# Patient Record
Sex: FEMALE | Race: WHITE | NOT HISPANIC OR LATINO | Employment: OTHER | URBAN - METROPOLITAN AREA
[De-identification: names, ages, dates, MRNs, and addresses within clinical notes are randomized per-mention and may not be internally consistent; named-entity substitution may affect disease eponyms.]

---

## 2017-01-03 ENCOUNTER — RECORDS - HEALTHEAST (OUTPATIENT)
Dept: LAB | Facility: CLINIC | Age: 67
End: 2017-01-03

## 2017-01-03 LAB — HCV AB SERPL QL IA: NEGATIVE

## 2017-01-23 ENCOUNTER — INFUSION - HEALTHEAST (OUTPATIENT)
Dept: INFUSION THERAPY | Facility: CLINIC | Age: 67
End: 2017-01-23

## 2017-01-23 DIAGNOSIS — M06.09 SERONEGATIVE RHEUMATOID ARTHRITIS OF MULTIPLE SITES (H): ICD-10-CM

## 2017-02-15 ENCOUNTER — AMBULATORY - HEALTHEAST (OUTPATIENT)
Dept: LAB | Facility: CLINIC | Age: 67
End: 2017-02-15

## 2017-02-15 DIAGNOSIS — M06.9 RHEUMATOID ARTHRITIS (H): ICD-10-CM

## 2017-02-15 DIAGNOSIS — Z79.899 HIGH RISK MEDICATION USE: ICD-10-CM

## 2017-02-15 LAB
ALT SERPL W P-5'-P-CCNC: 17 U/L (ref 0–45)
CREAT SERPL-MCNC: 0.64 MG/DL (ref 0.6–1.1)
GFR SERPL CREATININE-BSD FRML MDRD: >60 ML/MIN/1.73M2

## 2017-02-22 ENCOUNTER — INFUSION - HEALTHEAST (OUTPATIENT)
Dept: INFUSION THERAPY | Facility: CLINIC | Age: 67
End: 2017-02-22

## 2017-02-22 DIAGNOSIS — M06.09 SERONEGATIVE RHEUMATOID ARTHRITIS OF MULTIPLE SITES (H): ICD-10-CM

## 2017-03-22 ENCOUNTER — COMMUNICATION - HEALTHEAST (OUTPATIENT)
Dept: RHEUMATOLOGY | Facility: CLINIC | Age: 67
End: 2017-03-22

## 2017-03-22 DIAGNOSIS — M06.9 RHEUMATOID ARTHRITIS (H): ICD-10-CM

## 2017-03-27 ENCOUNTER — INFUSION - HEALTHEAST (OUTPATIENT)
Dept: INFUSION THERAPY | Facility: CLINIC | Age: 67
End: 2017-03-27

## 2017-03-27 DIAGNOSIS — Z79.899 HIGH RISK MEDICATION USE: ICD-10-CM

## 2017-03-27 DIAGNOSIS — M06.09 SERONEGATIVE RHEUMATOID ARTHRITIS OF MULTIPLE SITES (H): ICD-10-CM

## 2017-03-27 DIAGNOSIS — M06.9 RHEUMATOID ARTHRITIS (H): ICD-10-CM

## 2017-03-27 LAB
ALT SERPL W P-5'-P-CCNC: 17 U/L (ref 0–45)
CREAT SERPL-MCNC: 0.63 MG/DL (ref 0.6–1.1)
GFR SERPL CREATININE-BSD FRML MDRD: >60 ML/MIN/1.73M2

## 2017-04-14 ENCOUNTER — AMBULATORY - HEALTHEAST (OUTPATIENT)
Dept: LAB | Facility: CLINIC | Age: 67
End: 2017-04-14

## 2017-04-14 DIAGNOSIS — Z79.899 HIGH RISK MEDICATION USE: ICD-10-CM

## 2017-04-14 DIAGNOSIS — M06.9 RHEUMATOID ARTHRITIS (H): ICD-10-CM

## 2017-04-14 LAB
ALT SERPL W P-5'-P-CCNC: 21 U/L (ref 0–45)
CREAT SERPL-MCNC: 0.68 MG/DL (ref 0.6–1.1)
GFR SERPL CREATININE-BSD FRML MDRD: >60 ML/MIN/1.73M2

## 2017-04-20 ENCOUNTER — OFFICE VISIT - HEALTHEAST (OUTPATIENT)
Dept: RHEUMATOLOGY | Facility: CLINIC | Age: 67
End: 2017-04-20

## 2017-04-20 DIAGNOSIS — G89.29 CHRONIC PAIN OF RIGHT KNEE: ICD-10-CM

## 2017-04-20 DIAGNOSIS — M06.09 SERONEGATIVE RHEUMATOID ARTHRITIS OF MULTIPLE SITES (H): ICD-10-CM

## 2017-04-20 DIAGNOSIS — M19.041 PRIMARY OSTEOARTHRITIS OF BOTH HANDS: ICD-10-CM

## 2017-04-20 DIAGNOSIS — M19.042 PRIMARY OSTEOARTHRITIS OF BOTH HANDS: ICD-10-CM

## 2017-04-20 DIAGNOSIS — M17.0 BILATERAL PRIMARY OSTEOARTHRITIS OF KNEE: ICD-10-CM

## 2017-04-20 DIAGNOSIS — M25.561 CHRONIC PAIN OF RIGHT KNEE: ICD-10-CM

## 2017-04-20 DIAGNOSIS — Z79.899 HIGH RISK MEDICATION USE: ICD-10-CM

## 2017-04-20 ASSESSMENT — MIFFLIN-ST. JEOR: SCORE: 1590.23

## 2017-04-24 ENCOUNTER — COMMUNICATION - HEALTHEAST (OUTPATIENT)
Dept: ENDOCRINOLOGY | Facility: CLINIC | Age: 67
End: 2017-04-24

## 2017-04-26 ENCOUNTER — INFUSION - HEALTHEAST (OUTPATIENT)
Dept: INFUSION THERAPY | Facility: CLINIC | Age: 67
End: 2017-04-26

## 2017-04-26 DIAGNOSIS — M06.09 SERONEGATIVE RHEUMATOID ARTHRITIS OF MULTIPLE SITES (H): ICD-10-CM

## 2017-04-27 ENCOUNTER — OFFICE VISIT - HEALTHEAST (OUTPATIENT)
Dept: RHEUMATOLOGY | Facility: CLINIC | Age: 67
End: 2017-04-27

## 2017-04-27 DIAGNOSIS — M17.0 BILATERAL PRIMARY OSTEOARTHRITIS OF KNEE: ICD-10-CM

## 2017-04-27 DIAGNOSIS — G89.29 CHRONIC PAIN OF RIGHT KNEE: ICD-10-CM

## 2017-04-27 DIAGNOSIS — M25.561 CHRONIC PAIN OF RIGHT KNEE: ICD-10-CM

## 2017-04-27 ASSESSMENT — MIFFLIN-ST. JEOR: SCORE: 1590.23

## 2017-05-04 ENCOUNTER — OFFICE VISIT - HEALTHEAST (OUTPATIENT)
Dept: RHEUMATOLOGY | Facility: CLINIC | Age: 67
End: 2017-05-04

## 2017-05-04 DIAGNOSIS — M17.0 BILATERAL PRIMARY OSTEOARTHRITIS OF KNEE: ICD-10-CM

## 2017-05-30 ENCOUNTER — INFUSION - HEALTHEAST (OUTPATIENT)
Dept: INFUSION THERAPY | Facility: CLINIC | Age: 67
End: 2017-05-30

## 2017-05-30 DIAGNOSIS — M06.09 SERONEGATIVE RHEUMATOID ARTHRITIS OF MULTIPLE SITES (H): ICD-10-CM

## 2017-06-13 ENCOUNTER — COMMUNICATION - HEALTHEAST (OUTPATIENT)
Dept: RHEUMATOLOGY | Facility: CLINIC | Age: 67
End: 2017-06-13

## 2017-06-13 DIAGNOSIS — M06.9 RHEUMATOID ARTHRITIS (H): ICD-10-CM

## 2017-06-28 ENCOUNTER — INFUSION - HEALTHEAST (OUTPATIENT)
Dept: INFUSION THERAPY | Facility: CLINIC | Age: 67
End: 2017-06-28

## 2017-06-28 DIAGNOSIS — M06.9 RHEUMATOID ARTHRITIS (H): ICD-10-CM

## 2017-06-28 DIAGNOSIS — M06.09 SERONEGATIVE RHEUMATOID ARTHRITIS OF MULTIPLE SITES (H): ICD-10-CM

## 2017-06-28 LAB
ALT SERPL W P-5'-P-CCNC: 20 U/L (ref 0–45)
CREAT SERPL-MCNC: 0.62 MG/DL (ref 0.6–1.1)
GFR SERPL CREATININE-BSD FRML MDRD: >60 ML/MIN/1.73M2

## 2017-07-06 ENCOUNTER — OFFICE VISIT - HEALTHEAST (OUTPATIENT)
Dept: RHEUMATOLOGY | Facility: CLINIC | Age: 67
End: 2017-07-06

## 2017-07-06 DIAGNOSIS — Z79.899 HIGH RISK MEDICATION USE: ICD-10-CM

## 2017-07-06 DIAGNOSIS — M25.561 CHRONIC PAIN OF RIGHT KNEE: ICD-10-CM

## 2017-07-06 DIAGNOSIS — M17.0 BILATERAL PRIMARY OSTEOARTHRITIS OF KNEE: ICD-10-CM

## 2017-07-06 DIAGNOSIS — G89.29 CHRONIC PAIN OF RIGHT KNEE: ICD-10-CM

## 2017-07-06 DIAGNOSIS — M06.09 SERONEGATIVE RHEUMATOID ARTHRITIS OF MULTIPLE SITES (H): ICD-10-CM

## 2017-07-06 RX ORDER — FOLIC ACID 1 MG/1
3 TABLET ORAL DAILY
Qty: 270 TABLET | Refills: 3 | Status: SHIPPED | OUTPATIENT
Start: 2017-07-06 | End: 2017-10-04

## 2017-07-25 ENCOUNTER — COMMUNICATION - HEALTHEAST (OUTPATIENT)
Dept: RHEUMATOLOGY | Facility: CLINIC | Age: 67
End: 2017-07-25

## 2017-07-25 DIAGNOSIS — M06.9 RHEUMATOID ARTHRITIS (H): ICD-10-CM

## 2017-07-31 ENCOUNTER — INFUSION - HEALTHEAST (OUTPATIENT)
Dept: INFUSION THERAPY | Facility: CLINIC | Age: 67
End: 2017-07-31

## 2017-07-31 DIAGNOSIS — M06.09 SERONEGATIVE RHEUMATOID ARTHRITIS OF MULTIPLE SITES (H): ICD-10-CM

## 2017-07-31 RX ORDER — DULOXETIN HYDROCHLORIDE 30 MG/1
CAPSULE, DELAYED RELEASE ORAL
Refills: 3 | Status: SHIPPED | COMMUNITY
Start: 2017-06-29

## 2017-08-02 ENCOUNTER — RECORDS - HEALTHEAST (OUTPATIENT)
Dept: ADMINISTRATIVE | Facility: OTHER | Age: 67
End: 2017-08-02

## 2017-08-30 ENCOUNTER — INFUSION - HEALTHEAST (OUTPATIENT)
Dept: INFUSION THERAPY | Facility: CLINIC | Age: 67
End: 2017-08-30

## 2017-08-30 DIAGNOSIS — M06.09 SERONEGATIVE RHEUMATOID ARTHRITIS OF MULTIPLE SITES (H): ICD-10-CM

## 2017-09-06 ENCOUNTER — AMBULATORY - HEALTHEAST (OUTPATIENT)
Dept: LAB | Facility: CLINIC | Age: 67
End: 2017-09-06

## 2017-09-06 DIAGNOSIS — M06.9 RHEUMATOID ARTHRITIS (H): ICD-10-CM

## 2017-09-06 LAB
ALT SERPL W P-5'-P-CCNC: 22 U/L (ref 0–45)
CREAT SERPL-MCNC: 0.63 MG/DL (ref 0.6–1.1)
GFR SERPL CREATININE-BSD FRML MDRD: >60 ML/MIN/1.73M2

## 2017-09-21 ENCOUNTER — AMBULATORY - HEALTHEAST (OUTPATIENT)
Dept: PHYSICAL MEDICINE AND REHAB | Facility: CLINIC | Age: 67
End: 2017-09-21

## 2017-09-21 DIAGNOSIS — M54.50 LOWER BACK PAIN: ICD-10-CM

## 2017-09-22 ENCOUNTER — HOSPITAL ENCOUNTER (OUTPATIENT)
Dept: PHYSICAL MEDICINE AND REHAB | Facility: CLINIC | Age: 67
Discharge: HOME | End: 2017-09-22
Attending: PHYSICIAN ASSISTANT

## 2017-09-22 DIAGNOSIS — Z98.1 HISTORY OF LUMBAR FUSION: ICD-10-CM

## 2017-09-22 DIAGNOSIS — G89.29 CHRONIC LOW BACK PAIN: ICD-10-CM

## 2017-09-22 DIAGNOSIS — R53.81 PHYSICAL DECONDITIONING: ICD-10-CM

## 2017-09-22 DIAGNOSIS — M54.50 CHRONIC LOW BACK PAIN: ICD-10-CM

## 2017-09-22 DIAGNOSIS — R26.89 BALANCE PROBLEMS: ICD-10-CM

## 2017-10-06 ENCOUNTER — OFFICE VISIT - HEALTHEAST (OUTPATIENT)
Dept: PHYSICAL THERAPY | Facility: REHABILITATION | Age: 67
End: 2017-10-06

## 2017-10-06 DIAGNOSIS — R29.3 ABNORMAL POSTURE: ICD-10-CM

## 2017-10-06 DIAGNOSIS — M54.50 LUMBAR SPINE PAIN: ICD-10-CM

## 2017-10-06 DIAGNOSIS — M62.81 GENERALIZED MUSCLE WEAKNESS: ICD-10-CM

## 2017-10-06 DIAGNOSIS — Z74.09 IMPAIRED FUNCTIONAL MOBILITY, BALANCE, GAIT, AND ENDURANCE: ICD-10-CM

## 2017-10-10 ENCOUNTER — OFFICE VISIT - HEALTHEAST (OUTPATIENT)
Dept: PHYSICAL THERAPY | Facility: REHABILITATION | Age: 67
End: 2017-10-10

## 2017-10-10 ENCOUNTER — INFUSION - HEALTHEAST (OUTPATIENT)
Dept: INFUSION THERAPY | Facility: CLINIC | Age: 67
End: 2017-10-10

## 2017-10-10 ENCOUNTER — COMMUNICATION - HEALTHEAST (OUTPATIENT)
Dept: PHYSICAL MEDICINE AND REHAB | Facility: CLINIC | Age: 67
End: 2017-10-10

## 2017-10-10 DIAGNOSIS — R29.3 ABNORMAL POSTURE: ICD-10-CM

## 2017-10-10 DIAGNOSIS — M54.50 LUMBAR SPINE PAIN: ICD-10-CM

## 2017-10-10 DIAGNOSIS — M06.09 SERONEGATIVE RHEUMATOID ARTHRITIS OF MULTIPLE SITES (H): ICD-10-CM

## 2017-10-10 DIAGNOSIS — Z74.09 IMPAIRED FUNCTIONAL MOBILITY, BALANCE, GAIT, AND ENDURANCE: ICD-10-CM

## 2017-10-10 DIAGNOSIS — M62.81 GENERALIZED MUSCLE WEAKNESS: ICD-10-CM

## 2017-10-11 ENCOUNTER — RECORDS - HEALTHEAST (OUTPATIENT)
Dept: ADMINISTRATIVE | Facility: OTHER | Age: 67
End: 2017-10-11

## 2017-10-18 ENCOUNTER — OFFICE VISIT - HEALTHEAST (OUTPATIENT)
Dept: PHYSICAL THERAPY | Facility: REHABILITATION | Age: 67
End: 2017-10-18

## 2017-10-18 DIAGNOSIS — Z74.09 IMPAIRED FUNCTIONAL MOBILITY, BALANCE, GAIT, AND ENDURANCE: ICD-10-CM

## 2017-10-18 DIAGNOSIS — M54.50 LUMBAR SPINE PAIN: ICD-10-CM

## 2017-10-18 DIAGNOSIS — R29.3 ABNORMAL POSTURE: ICD-10-CM

## 2017-10-18 DIAGNOSIS — M62.81 GENERALIZED MUSCLE WEAKNESS: ICD-10-CM

## 2017-10-25 ENCOUNTER — OFFICE VISIT - HEALTHEAST (OUTPATIENT)
Dept: PHYSICAL THERAPY | Facility: REHABILITATION | Age: 67
End: 2017-10-25

## 2017-10-25 DIAGNOSIS — R29.3 ABNORMAL POSTURE: ICD-10-CM

## 2017-10-25 DIAGNOSIS — M62.81 GENERALIZED MUSCLE WEAKNESS: ICD-10-CM

## 2017-10-25 DIAGNOSIS — M54.50 LUMBAR SPINE PAIN: ICD-10-CM

## 2017-10-25 DIAGNOSIS — Z74.09 IMPAIRED FUNCTIONAL MOBILITY, BALANCE, GAIT, AND ENDURANCE: ICD-10-CM

## 2017-11-01 ENCOUNTER — OFFICE VISIT - HEALTHEAST (OUTPATIENT)
Dept: PHYSICAL THERAPY | Facility: REHABILITATION | Age: 67
End: 2017-11-01

## 2017-11-01 DIAGNOSIS — R29.3 ABNORMAL POSTURE: ICD-10-CM

## 2017-11-01 DIAGNOSIS — M62.81 GENERALIZED MUSCLE WEAKNESS: ICD-10-CM

## 2017-11-01 DIAGNOSIS — M54.50 LUMBAR SPINE PAIN: ICD-10-CM

## 2017-11-01 DIAGNOSIS — Z74.09 IMPAIRED FUNCTIONAL MOBILITY, BALANCE, GAIT, AND ENDURANCE: ICD-10-CM

## 2017-11-06 ENCOUNTER — AMBULATORY - HEALTHEAST (OUTPATIENT)
Dept: RHEUMATOLOGY | Facility: CLINIC | Age: 67
End: 2017-11-06

## 2017-11-06 ENCOUNTER — AMBULATORY - HEALTHEAST (OUTPATIENT)
Dept: LAB | Facility: CLINIC | Age: 67
End: 2017-11-06

## 2017-11-06 DIAGNOSIS — M06.9 RHEUMATOID ARTHRITIS (H): ICD-10-CM

## 2017-11-06 LAB
ALT SERPL W P-5'-P-CCNC: 23 U/L (ref 0–45)
CREAT SERPL-MCNC: 0.6 MG/DL (ref 0.6–1.1)
GFR SERPL CREATININE-BSD FRML MDRD: >60 ML/MIN/1.73M2

## 2017-11-08 ENCOUNTER — OFFICE VISIT - HEALTHEAST (OUTPATIENT)
Dept: RHEUMATOLOGY | Facility: CLINIC | Age: 67
End: 2017-11-08

## 2017-11-08 DIAGNOSIS — M06.00 SERONEGATIVE RHEUMATOID ARTHRITIS (H): ICD-10-CM

## 2017-11-08 DIAGNOSIS — Z98.84 HISTORY OF GASTRIC BYPASS: ICD-10-CM

## 2017-11-08 DIAGNOSIS — M17.11 OSTEOARTHRITIS OF RIGHT KNEE, UNSPECIFIED OSTEOARTHRITIS TYPE: ICD-10-CM

## 2017-11-08 DIAGNOSIS — Z79.899 HIGH RISK MEDICATION USE: ICD-10-CM

## 2017-11-08 DIAGNOSIS — M06.09 SERONEGATIVE RHEUMATOID ARTHRITIS OF MULTIPLE SITES (H): ICD-10-CM

## 2017-11-08 DIAGNOSIS — M79.7 FIBROMYALGIA: ICD-10-CM

## 2017-11-08 DIAGNOSIS — M81.0 OSTEOPOROSIS: ICD-10-CM

## 2017-11-08 ASSESSMENT — MIFFLIN-ST. JEOR: SCORE: 1535.8

## 2017-11-09 ENCOUNTER — INFUSION - HEALTHEAST (OUTPATIENT)
Dept: INFUSION THERAPY | Facility: CLINIC | Age: 67
End: 2017-11-09

## 2017-11-09 DIAGNOSIS — M06.09 SERONEGATIVE RHEUMATOID ARTHRITIS OF MULTIPLE SITES (H): ICD-10-CM

## 2017-11-16 ENCOUNTER — COMMUNICATION - HEALTHEAST (OUTPATIENT)
Dept: RHEUMATOLOGY | Facility: CLINIC | Age: 67
End: 2017-11-16

## 2017-11-16 DIAGNOSIS — Z79.899 HIGH RISK MEDICATION USE: ICD-10-CM

## 2017-12-11 ENCOUNTER — INFUSION - HEALTHEAST (OUTPATIENT)
Dept: INFUSION THERAPY | Facility: CLINIC | Age: 67
End: 2017-12-11

## 2017-12-11 DIAGNOSIS — M06.09 SERONEGATIVE RHEUMATOID ARTHRITIS OF MULTIPLE SITES (H): ICD-10-CM

## 2018-01-10 ENCOUNTER — INFUSION - HEALTHEAST (OUTPATIENT)
Dept: INFUSION THERAPY | Facility: CLINIC | Age: 68
End: 2018-01-10

## 2018-01-10 DIAGNOSIS — M06.09 SERONEGATIVE RHEUMATOID ARTHRITIS OF MULTIPLE SITES (H): ICD-10-CM

## 2018-02-12 ENCOUNTER — INFUSION - HEALTHEAST (OUTPATIENT)
Dept: INFUSION THERAPY | Facility: CLINIC | Age: 68
End: 2018-02-12

## 2018-02-12 DIAGNOSIS — M06.09 SERONEGATIVE RHEUMATOID ARTHRITIS OF MULTIPLE SITES (H): ICD-10-CM

## 2018-02-12 DIAGNOSIS — Z79.899 HIGH RISK MEDICATION USE: ICD-10-CM

## 2018-02-12 DIAGNOSIS — M06.00 SERONEGATIVE RHEUMATOID ARTHRITIS (H): ICD-10-CM

## 2018-02-12 LAB
ALBUMIN SERPL-MCNC: 3.4 G/DL (ref 3.5–5)
ALT SERPL W P-5'-P-CCNC: 18 U/L (ref 0–45)
AST SERPL W P-5'-P-CCNC: 16 U/L (ref 0–40)
C REACTIVE PROTEIN LHE: 0.7 MG/DL (ref 0–0.8)
CREAT SERPL-MCNC: 0.64 MG/DL (ref 0.6–1.1)
ERYTHROCYTE [DISTWIDTH] IN BLOOD BY AUTOMATED COUNT: 14.4 % (ref 11–14.5)
ERYTHROCYTE [SEDIMENTATION RATE] IN BLOOD BY WESTERGREN METHOD: 25 MM/HR (ref 0–20)
GFR SERPL CREATININE-BSD FRML MDRD: >60 ML/MIN/1.73M2
HCT VFR BLD AUTO: 39.6 % (ref 35–47)
HGB BLD-MCNC: 13.3 G/DL (ref 12–16)
MCH RBC QN AUTO: 31.3 PG (ref 27–34)
MCHC RBC AUTO-ENTMCNC: 33.6 G/DL (ref 32–36)
MCV RBC AUTO: 93 FL (ref 80–100)
PLATELET # BLD AUTO: 288 THOU/UL (ref 140–440)
PMV BLD AUTO: 12 FL (ref 8.5–12.5)
RBC # BLD AUTO: 4.25 MILL/UL (ref 3.8–5.4)
WBC: 8.9 THOU/UL (ref 4–11)

## 2018-03-14 ENCOUNTER — INFUSION - HEALTHEAST (OUTPATIENT)
Dept: INFUSION THERAPY | Facility: CLINIC | Age: 68
End: 2018-03-14

## 2018-03-14 DIAGNOSIS — M06.09 SERONEGATIVE RHEUMATOID ARTHRITIS OF MULTIPLE SITES (H): ICD-10-CM

## 2018-04-19 ENCOUNTER — INFUSION - HEALTHEAST (OUTPATIENT)
Dept: INFUSION THERAPY | Facility: CLINIC | Age: 68
End: 2018-04-19

## 2018-04-19 DIAGNOSIS — M06.09 SERONEGATIVE RHEUMATOID ARTHRITIS OF MULTIPLE SITES (H): ICD-10-CM

## 2018-04-19 DIAGNOSIS — Z79.899 HIGH RISK MEDICATION USE: ICD-10-CM

## 2018-04-19 DIAGNOSIS — M06.00 SERONEGATIVE RHEUMATOID ARTHRITIS (H): ICD-10-CM

## 2018-04-19 LAB
ALBUMIN SERPL-MCNC: 3.1 G/DL (ref 3.5–5)
ALT SERPL W P-5'-P-CCNC: 23 U/L (ref 0–45)
AST SERPL W P-5'-P-CCNC: 20 U/L (ref 0–40)
CREAT SERPL-MCNC: 0.61 MG/DL (ref 0.6–1.1)
ERYTHROCYTE [DISTWIDTH] IN BLOOD BY AUTOMATED COUNT: 14.4 % (ref 11–14.5)
GFR SERPL CREATININE-BSD FRML MDRD: >60 ML/MIN/1.73M2
HCT VFR BLD AUTO: 38.1 % (ref 35–47)
HGB BLD-MCNC: 12.7 G/DL (ref 12–16)
MCH RBC QN AUTO: 31.1 PG (ref 27–34)
MCHC RBC AUTO-ENTMCNC: 33.3 G/DL (ref 32–36)
MCV RBC AUTO: 93 FL (ref 80–100)
PLATELET # BLD AUTO: 293 THOU/UL (ref 140–440)
PMV BLD AUTO: 11.5 FL (ref 8.5–12.5)
RBC # BLD AUTO: 4.08 MILL/UL (ref 3.8–5.4)
WBC: 8.7 THOU/UL (ref 4–11)

## 2018-04-30 ENCOUNTER — OFFICE VISIT - HEALTHEAST (OUTPATIENT)
Dept: RHEUMATOLOGY | Facility: CLINIC | Age: 68
End: 2018-04-30

## 2018-04-30 DIAGNOSIS — Z79.899 HIGH RISK MEDICATION USE: ICD-10-CM

## 2018-04-30 DIAGNOSIS — M19.042 PRIMARY OSTEOARTHRITIS OF BOTH HANDS: ICD-10-CM

## 2018-04-30 DIAGNOSIS — M17.11 OSTEOARTHRITIS OF RIGHT KNEE, UNSPECIFIED OSTEOARTHRITIS TYPE: ICD-10-CM

## 2018-04-30 DIAGNOSIS — M19.041 PRIMARY OSTEOARTHRITIS OF BOTH HANDS: ICD-10-CM

## 2018-04-30 DIAGNOSIS — M06.00 SERONEGATIVE RHEUMATOID ARTHRITIS (H): ICD-10-CM

## 2018-04-30 DIAGNOSIS — M79.7 FIBROMYALGIA: ICD-10-CM

## 2018-04-30 ASSESSMENT — MIFFLIN-ST. JEOR: SCORE: 1517.66

## 2018-05-21 ENCOUNTER — INFUSION - HEALTHEAST (OUTPATIENT)
Dept: INFUSION THERAPY | Facility: CLINIC | Age: 68
End: 2018-05-21

## 2018-05-21 DIAGNOSIS — M06.09 SERONEGATIVE RHEUMATOID ARTHRITIS OF MULTIPLE SITES (H): ICD-10-CM

## 2018-06-19 ENCOUNTER — RECORDS - HEALTHEAST (OUTPATIENT)
Dept: LAB | Facility: CLINIC | Age: 68
End: 2018-06-19

## 2018-06-19 LAB
ANION GAP SERPL CALCULATED.3IONS-SCNC: 14 MMOL/L (ref 5–18)
BUN SERPL-MCNC: 22 MG/DL (ref 8–22)
CALCIUM SERPL-MCNC: 9.3 MG/DL (ref 8.5–10.5)
CHLORIDE BLD-SCNC: 105 MMOL/L (ref 98–107)
CO2 SERPL-SCNC: 23 MMOL/L (ref 22–31)
CREAT SERPL-MCNC: 0.67 MG/DL (ref 0.6–1.1)
GFR SERPL CREATININE-BSD FRML MDRD: >60 ML/MIN/1.73M2
GLUCOSE BLD-MCNC: 106 MG/DL (ref 70–125)
POTASSIUM BLD-SCNC: 3.6 MMOL/L (ref 3.5–5)
SODIUM SERPL-SCNC: 142 MMOL/L (ref 136–145)

## 2018-06-20 ENCOUNTER — INFUSION - HEALTHEAST (OUTPATIENT)
Dept: INFUSION THERAPY | Facility: CLINIC | Age: 68
End: 2018-06-20

## 2018-06-20 DIAGNOSIS — M06.09 SERONEGATIVE RHEUMATOID ARTHRITIS OF MULTIPLE SITES (H): ICD-10-CM

## 2018-07-23 ENCOUNTER — INFUSION - HEALTHEAST (OUTPATIENT)
Dept: INFUSION THERAPY | Facility: CLINIC | Age: 68
End: 2018-07-23

## 2018-07-23 DIAGNOSIS — M06.09 SERONEGATIVE RHEUMATOID ARTHRITIS OF MULTIPLE SITES (H): ICD-10-CM

## 2018-07-23 DIAGNOSIS — Z79.899 HIGH RISK MEDICATION USE: ICD-10-CM

## 2018-07-23 DIAGNOSIS — M06.00 SERONEGATIVE RHEUMATOID ARTHRITIS (H): ICD-10-CM

## 2018-07-23 LAB
ALBUMIN SERPL-MCNC: 3.1 G/DL (ref 3.5–5)
ALT SERPL W P-5'-P-CCNC: 20 U/L (ref 0–45)
CREAT SERPL-MCNC: 0.55 MG/DL (ref 0.6–1.1)
ERYTHROCYTE [DISTWIDTH] IN BLOOD BY AUTOMATED COUNT: 15.2 % (ref 11–14.5)
GFR SERPL CREATININE-BSD FRML MDRD: >60 ML/MIN/1.73M2
HCT VFR BLD AUTO: 36.2 % (ref 35–47)
HGB BLD-MCNC: 12.1 G/DL (ref 12–16)
MCH RBC QN AUTO: 31.8 PG (ref 27–34)
MCHC RBC AUTO-ENTMCNC: 33.4 G/DL (ref 32–36)
MCV RBC AUTO: 95 FL (ref 80–100)
PLATELET # BLD AUTO: 253 THOU/UL (ref 140–440)
PMV BLD AUTO: 11.6 FL (ref 8.5–12.5)
RBC # BLD AUTO: 3.8 MILL/UL (ref 3.8–5.4)
WBC: 7.7 THOU/UL (ref 4–11)

## 2018-08-22 ENCOUNTER — INFUSION - HEALTHEAST (OUTPATIENT)
Dept: INFUSION THERAPY | Facility: CLINIC | Age: 68
End: 2018-08-22

## 2018-08-22 DIAGNOSIS — M06.09 SERONEGATIVE RHEUMATOID ARTHRITIS OF MULTIPLE SITES (H): ICD-10-CM

## 2018-08-22 LAB — AST SERPL W P-5'-P-CCNC: 15 U/L (ref 0–40)

## 2018-08-29 ENCOUNTER — OFFICE VISIT - HEALTHEAST (OUTPATIENT)
Dept: RHEUMATOLOGY | Facility: CLINIC | Age: 68
End: 2018-08-29

## 2018-08-29 DIAGNOSIS — M15.9 OSTEOARTHRITIS OF MULTIPLE JOINTS, UNSPECIFIED OSTEOARTHRITIS TYPE: ICD-10-CM

## 2018-08-29 DIAGNOSIS — M06.00 SERONEGATIVE RHEUMATOID ARTHRITIS (H): ICD-10-CM

## 2018-08-29 DIAGNOSIS — G89.29 CHRONIC BILATERAL LOW BACK PAIN, WITH SCIATICA PRESENCE UNSPECIFIED: ICD-10-CM

## 2018-08-29 DIAGNOSIS — Z79.899 HIGH RISK MEDICATION USE: ICD-10-CM

## 2018-08-29 DIAGNOSIS — G89.29 CHRONIC PAIN OF RIGHT KNEE: ICD-10-CM

## 2018-08-29 DIAGNOSIS — M54.5 CHRONIC BILATERAL LOW BACK PAIN, WITH SCIATICA PRESENCE UNSPECIFIED: ICD-10-CM

## 2018-08-29 DIAGNOSIS — M25.561 CHRONIC PAIN OF RIGHT KNEE: ICD-10-CM

## 2018-08-29 RX ORDER — LIDOCAINE 50 MG/G
PATCH TOPICAL
Qty: 30 PATCH | Refills: 2 | Status: SHIPPED | OUTPATIENT
Start: 2018-08-29

## 2018-08-29 ASSESSMENT — MIFFLIN-ST. JEOR: SCORE: 1517.66

## 2018-08-30 ENCOUNTER — COMMUNICATION - HEALTHEAST (OUTPATIENT)
Dept: RHEUMATOLOGY | Facility: CLINIC | Age: 68
End: 2018-08-30

## 2018-08-30 DIAGNOSIS — M06.00 SERONEGATIVE RHEUMATOID ARTHRITIS (H): ICD-10-CM

## 2018-09-04 ENCOUNTER — COMMUNICATION - HEALTHEAST (OUTPATIENT)
Dept: RHEUMATOLOGY | Facility: CLINIC | Age: 68
End: 2018-09-04

## 2018-09-21 ENCOUNTER — INFUSION - HEALTHEAST (OUTPATIENT)
Dept: INFUSION THERAPY | Facility: CLINIC | Age: 68
End: 2018-09-21

## 2018-09-21 DIAGNOSIS — Z79.899 HIGH RISK MEDICATION USE: ICD-10-CM

## 2018-09-21 DIAGNOSIS — M06.09 SERONEGATIVE RHEUMATOID ARTHRITIS OF MULTIPLE SITES (H): ICD-10-CM

## 2018-09-21 LAB
ALBUMIN SERPL-MCNC: 3.2 G/DL (ref 3.5–5)
ALT SERPL W P-5'-P-CCNC: 18 U/L (ref 0–45)
AST SERPL W P-5'-P-CCNC: 12 U/L (ref 0–40)
CREAT SERPL-MCNC: 0.62 MG/DL (ref 0.6–1.1)
ERYTHROCYTE [DISTWIDTH] IN BLOOD BY AUTOMATED COUNT: 13.8 % (ref 11–14.5)
GFR SERPL CREATININE-BSD FRML MDRD: >60 ML/MIN/1.73M2
HCT VFR BLD AUTO: 38.9 % (ref 35–47)
HGB BLD-MCNC: 12.7 G/DL (ref 12–16)
MCH RBC QN AUTO: 31.4 PG (ref 27–34)
MCHC RBC AUTO-ENTMCNC: 32.6 G/DL (ref 32–36)
MCV RBC AUTO: 96 FL (ref 80–100)
PLATELET # BLD AUTO: 293 THOU/UL (ref 140–440)
PMV BLD AUTO: 11.3 FL (ref 8.5–12.5)
RBC # BLD AUTO: 4.05 MILL/UL (ref 3.8–5.4)
WBC: 7.6 THOU/UL (ref 4–11)

## 2018-10-10 ENCOUNTER — HOSPITAL ENCOUNTER (OUTPATIENT)
Dept: MAMMOGRAPHY | Facility: CLINIC | Age: 68
Discharge: HOME | End: 2018-10-10
Attending: PHYSICIAN ASSISTANT

## 2018-10-10 DIAGNOSIS — Z12.31 VISIT FOR SCREENING MAMMOGRAM: ICD-10-CM

## 2018-10-22 ENCOUNTER — INFUSION - HEALTHEAST (OUTPATIENT)
Dept: INFUSION THERAPY | Facility: CLINIC | Age: 68
End: 2018-10-22

## 2018-10-22 DIAGNOSIS — Z79.899 HIGH RISK MEDICATION USE: ICD-10-CM

## 2018-10-22 DIAGNOSIS — M06.00 SERONEGATIVE RHEUMATOID ARTHRITIS (H): ICD-10-CM

## 2018-10-22 DIAGNOSIS — M06.09 SERONEGATIVE RHEUMATOID ARTHRITIS OF MULTIPLE SITES (H): ICD-10-CM

## 2018-10-22 LAB
ALBUMIN SERPL-MCNC: 3.4 G/DL (ref 3.5–5)
ALT SERPL W P-5'-P-CCNC: 17 U/L (ref 0–45)
AST SERPL W P-5'-P-CCNC: 14 U/L (ref 0–40)
CREAT SERPL-MCNC: 0.67 MG/DL (ref 0.6–1.1)
ERYTHROCYTE [DISTWIDTH] IN BLOOD BY AUTOMATED COUNT: 14.1 % (ref 11–14.5)
GFR SERPL CREATININE-BSD FRML MDRD: >60 ML/MIN/1.73M2
HCT VFR BLD AUTO: 39.5 % (ref 35–47)
HGB BLD-MCNC: 13.3 G/DL (ref 12–16)
MCH RBC QN AUTO: 31.7 PG (ref 27–34)
MCHC RBC AUTO-ENTMCNC: 33.7 G/DL (ref 32–36)
MCV RBC AUTO: 94 FL (ref 80–100)
PLATELET # BLD AUTO: 277 THOU/UL (ref 140–440)
PMV BLD AUTO: 11 FL (ref 8.5–12.5)
RBC # BLD AUTO: 4.2 MILL/UL (ref 3.8–5.4)
WBC: 9 THOU/UL (ref 4–11)

## 2018-10-23 ENCOUNTER — COMMUNICATION - HEALTHEAST (OUTPATIENT)
Dept: RHEUMATOLOGY | Facility: CLINIC | Age: 68
End: 2018-10-23

## 2018-10-24 ENCOUNTER — OFFICE VISIT - HEALTHEAST (OUTPATIENT)
Dept: RHEUMATOLOGY | Facility: CLINIC | Age: 68
End: 2018-10-24

## 2018-10-24 DIAGNOSIS — G89.29 CHRONIC PAIN OF RIGHT KNEE: ICD-10-CM

## 2018-10-24 DIAGNOSIS — M17.11 OSTEOARTHRITIS OF RIGHT KNEE, UNSPECIFIED OSTEOARTHRITIS TYPE: ICD-10-CM

## 2018-10-24 DIAGNOSIS — M25.561 CHRONIC PAIN OF RIGHT KNEE: ICD-10-CM

## 2018-11-11 ENCOUNTER — AMBULATORY - HEALTHEAST (OUTPATIENT)
Dept: RHEUMATOLOGY | Facility: CLINIC | Age: 68
End: 2018-11-11

## 2018-11-11 DIAGNOSIS — M06.00 SERONEGATIVE RHEUMATOID ARTHRITIS (H): ICD-10-CM

## 2018-11-19 ENCOUNTER — RECORDS - HEALTHEAST (OUTPATIENT)
Dept: LAB | Facility: CLINIC | Age: 68
End: 2018-11-19

## 2018-11-19 LAB
ALBUMIN SERPL-MCNC: 3.6 G/DL (ref 3.5–5)
ALP SERPL-CCNC: 85 U/L (ref 45–120)
ALT SERPL W P-5'-P-CCNC: 23 U/L (ref 0–45)
ANION GAP SERPL CALCULATED.3IONS-SCNC: 17 MMOL/L (ref 5–18)
AST SERPL W P-5'-P-CCNC: 17 U/L (ref 0–40)
BILIRUB SERPL-MCNC: 0.5 MG/DL (ref 0–1)
BUN SERPL-MCNC: 23 MG/DL (ref 8–22)
CALCIUM SERPL-MCNC: 10.3 MG/DL (ref 8.5–10.5)
CHLORIDE BLD-SCNC: 102 MMOL/L (ref 98–107)
CO2 SERPL-SCNC: 25 MMOL/L (ref 22–31)
CREAT SERPL-MCNC: 0.64 MG/DL (ref 0.6–1.1)
GFR SERPL CREATININE-BSD FRML MDRD: >60 ML/MIN/1.73M2
GLUCOSE BLD-MCNC: 91 MG/DL (ref 70–125)
MAGNESIUM SERPL-MCNC: 1.9 MG/DL (ref 1.8–2.6)
PHOSPHATE SERPL-MCNC: 2.3 MG/DL (ref 2.5–4.5)
POTASSIUM BLD-SCNC: 3.4 MMOL/L (ref 3.5–5)
PROT SERPL-MCNC: 6.8 G/DL (ref 6–8)
SODIUM SERPL-SCNC: 144 MMOL/L (ref 136–145)
TSH SERPL DL<=0.005 MIU/L-ACNC: 2.59 UIU/ML (ref 0.3–5)
VIT B12 SERPL-MCNC: >2000 PG/ML (ref 213–816)

## 2018-11-20 LAB — 25(OH)D3 SERPL-MCNC: 65.6 NG/ML (ref 30–80)

## 2018-11-21 ENCOUNTER — INFUSION - HEALTHEAST (OUTPATIENT)
Dept: INFUSION THERAPY | Facility: CLINIC | Age: 68
End: 2018-11-21

## 2018-11-21 DIAGNOSIS — M06.09 SERONEGATIVE RHEUMATOID ARTHRITIS OF MULTIPLE SITES (H): ICD-10-CM

## 2018-11-21 RX ORDER — PANTOPRAZOLE SODIUM 20 MG/1
20 TABLET, DELAYED RELEASE ORAL DAILY
Status: SHIPPED | COMMUNITY
Start: 2018-11-21 | End: 2021-09-14

## 2018-11-30 ENCOUNTER — COMMUNICATION - HEALTHEAST (OUTPATIENT)
Dept: ADMINISTRATIVE | Facility: CLINIC | Age: 68
End: 2018-11-30

## 2018-11-30 DIAGNOSIS — M06.09 SERONEGATIVE RHEUMATOID ARTHRITIS OF MULTIPLE SITES (H): ICD-10-CM

## 2018-12-21 ENCOUNTER — INFUSION - HEALTHEAST (OUTPATIENT)
Dept: INFUSION THERAPY | Facility: CLINIC | Age: 68
End: 2018-12-21

## 2018-12-21 DIAGNOSIS — M06.09 SERONEGATIVE RHEUMATOID ARTHRITIS OF MULTIPLE SITES (H): ICD-10-CM

## 2019-02-07 ENCOUNTER — COMMUNICATION - HEALTHEAST (OUTPATIENT)
Dept: ADMINISTRATIVE | Facility: CLINIC | Age: 69
End: 2019-02-07

## 2019-03-11 ENCOUNTER — INFUSION - HEALTHEAST (OUTPATIENT)
Dept: INFUSION THERAPY | Facility: CLINIC | Age: 69
End: 2019-03-11

## 2019-03-11 DIAGNOSIS — M06.09 SERONEGATIVE RHEUMATOID ARTHRITIS OF MULTIPLE SITES (H): ICD-10-CM

## 2019-03-11 LAB
ALBUMIN SERPL-MCNC: 3.2 G/DL (ref 3.5–5)
ALT SERPL W P-5'-P-CCNC: 27 U/L (ref 0–45)
AST SERPL W P-5'-P-CCNC: 17 U/L (ref 0–40)
CREAT SERPL-MCNC: 0.71 MG/DL (ref 0.6–1.1)
ERYTHROCYTE [DISTWIDTH] IN BLOOD BY AUTOMATED COUNT: 14.4 % (ref 11–14.5)
GFR SERPL CREATININE-BSD FRML MDRD: >60 ML/MIN/1.73M2
HCT VFR BLD AUTO: 39.8 % (ref 35–47)
HGB BLD-MCNC: 13 G/DL (ref 12–16)
MCH RBC QN AUTO: 31.2 PG (ref 27–34)
MCHC RBC AUTO-ENTMCNC: 32.7 G/DL (ref 32–36)
MCV RBC AUTO: 95 FL (ref 80–100)
PLATELET # BLD AUTO: 260 THOU/UL (ref 140–440)
PMV BLD AUTO: 11.2 FL (ref 8.5–12.5)
RBC # BLD AUTO: 4.17 MILL/UL (ref 3.8–5.4)
WBC: 9 THOU/UL (ref 4–11)

## 2019-03-12 ENCOUNTER — COMMUNICATION - HEALTHEAST (OUTPATIENT)
Dept: RHEUMATOLOGY | Facility: CLINIC | Age: 69
End: 2019-03-12

## 2019-03-12 DIAGNOSIS — M06.9 RHEUMATOID ARTHRITIS (H): ICD-10-CM

## 2019-03-12 DIAGNOSIS — M06.00 SERONEGATIVE RHEUMATOID ARTHRITIS (H): ICD-10-CM

## 2019-03-13 ENCOUNTER — COMMUNICATION - HEALTHEAST (OUTPATIENT)
Dept: RHEUMATOLOGY | Facility: CLINIC | Age: 69
End: 2019-03-13

## 2019-04-03 ENCOUNTER — OFFICE VISIT - HEALTHEAST (OUTPATIENT)
Dept: RHEUMATOLOGY | Facility: CLINIC | Age: 69
End: 2019-04-03

## 2019-04-03 DIAGNOSIS — M17.11 OSTEOARTHRITIS OF RIGHT KNEE, UNSPECIFIED OSTEOARTHRITIS TYPE: ICD-10-CM

## 2019-04-03 DIAGNOSIS — G89.29 CHRONIC BILATERAL LOW BACK PAIN WITHOUT SCIATICA: ICD-10-CM

## 2019-04-03 DIAGNOSIS — M54.50 CHRONIC BILATERAL LOW BACK PAIN WITHOUT SCIATICA: ICD-10-CM

## 2019-04-03 DIAGNOSIS — M06.00 SERONEGATIVE RHEUMATOID ARTHRITIS (H): ICD-10-CM

## 2019-04-03 RX ORDER — HYDROCHLOROTHIAZIDE 12.5 MG/1
12.5 TABLET ORAL DAILY
Status: SHIPPED | COMMUNITY
Start: 2019-04-03

## 2019-04-03 RX ORDER — TRAMADOL HYDROCHLORIDE 50 MG/1
TABLET ORAL
Qty: 75 TABLET | Refills: 2 | Status: SHIPPED | OUTPATIENT
Start: 2019-04-03

## 2019-04-03 ASSESSMENT — MIFFLIN-ST. JEOR: SCORE: 1524.91

## 2019-04-04 ENCOUNTER — COMMUNICATION - HEALTHEAST (OUTPATIENT)
Dept: RHEUMATOLOGY | Facility: CLINIC | Age: 69
End: 2019-04-04

## 2019-04-10 ENCOUNTER — INFUSION - HEALTHEAST (OUTPATIENT)
Dept: INFUSION THERAPY | Facility: CLINIC | Age: 69
End: 2019-04-10

## 2019-04-10 DIAGNOSIS — M06.09 SERONEGATIVE RHEUMATOID ARTHRITIS OF MULTIPLE SITES (H): ICD-10-CM

## 2019-05-15 ENCOUNTER — INFUSION - HEALTHEAST (OUTPATIENT)
Dept: INFUSION THERAPY | Facility: CLINIC | Age: 69
End: 2019-05-15

## 2019-05-15 DIAGNOSIS — M06.09 SERONEGATIVE RHEUMATOID ARTHRITIS OF MULTIPLE SITES (H): ICD-10-CM

## 2019-05-15 LAB
ALBUMIN SERPL-MCNC: 3.2 G/DL (ref 3.5–5)
ALT SERPL W P-5'-P-CCNC: 41 U/L (ref 0–45)
AST SERPL W P-5'-P-CCNC: 30 U/L (ref 0–40)
CREAT SERPL-MCNC: 0.72 MG/DL (ref 0.6–1.1)
ERYTHROCYTE [DISTWIDTH] IN BLOOD BY AUTOMATED COUNT: 14.4 % (ref 11–14.5)
GFR SERPL CREATININE-BSD FRML MDRD: >60 ML/MIN/1.73M2
HCT VFR BLD AUTO: 39.6 % (ref 35–47)
HGB BLD-MCNC: 12.9 G/DL (ref 12–16)
MCH RBC QN AUTO: 31 PG (ref 27–34)
MCHC RBC AUTO-ENTMCNC: 32.6 G/DL (ref 32–36)
MCV RBC AUTO: 95 FL (ref 80–100)
PLATELET # BLD AUTO: 289 THOU/UL (ref 140–440)
PMV BLD AUTO: 11.4 FL (ref 8.5–12.5)
RBC # BLD AUTO: 4.16 MILL/UL (ref 3.8–5.4)
WBC: 7.5 THOU/UL (ref 4–11)

## 2019-05-30 ENCOUNTER — RECORDS - HEALTHEAST (OUTPATIENT)
Dept: LAB | Facility: CLINIC | Age: 69
End: 2019-05-30

## 2019-05-30 LAB
ALBUMIN SERPL-MCNC: 3.5 G/DL (ref 3.5–5)
ANION GAP SERPL CALCULATED.3IONS-SCNC: 12 MMOL/L (ref 5–18)
BUN SERPL-MCNC: 15 MG/DL (ref 8–22)
CALCIUM SERPL-MCNC: 9.7 MG/DL (ref 8.5–10.5)
CHLORIDE BLD-SCNC: 102 MMOL/L (ref 98–107)
CO2 SERPL-SCNC: 25 MMOL/L (ref 22–31)
CREAT SERPL-MCNC: 0.68 MG/DL (ref 0.6–1.1)
GFR SERPL CREATININE-BSD FRML MDRD: >60 ML/MIN/1.73M2
GLUCOSE BLD-MCNC: 89 MG/DL (ref 70–125)
PHOSPHATE SERPL-MCNC: 2.5 MG/DL (ref 2.5–4.5)
POTASSIUM BLD-SCNC: 3.9 MMOL/L (ref 3.5–5)
SODIUM SERPL-SCNC: 139 MMOL/L (ref 136–145)
VIT B12 SERPL-MCNC: >2000 PG/ML (ref 213–816)

## 2019-06-13 ENCOUNTER — INFUSION - HEALTHEAST (OUTPATIENT)
Dept: INFUSION THERAPY | Facility: CLINIC | Age: 69
End: 2019-06-13

## 2019-06-13 DIAGNOSIS — M06.09 SERONEGATIVE RHEUMATOID ARTHRITIS OF MULTIPLE SITES (H): ICD-10-CM

## 2019-06-19 ENCOUNTER — COMMUNICATION - HEALTHEAST (OUTPATIENT)
Dept: RHEUMATOLOGY | Facility: CLINIC | Age: 69
End: 2019-06-19

## 2019-06-19 DIAGNOSIS — Z79.899 HIGH RISK MEDICATION USE: ICD-10-CM

## 2019-07-03 ENCOUNTER — OFFICE VISIT - HEALTHEAST (OUTPATIENT)
Dept: RHEUMATOLOGY | Facility: CLINIC | Age: 69
End: 2019-07-03

## 2019-07-03 DIAGNOSIS — M79.641 PAIN IN BOTH HANDS: ICD-10-CM

## 2019-07-03 DIAGNOSIS — M25.561 CHRONIC PAIN OF RIGHT KNEE: ICD-10-CM

## 2019-07-03 DIAGNOSIS — Z79.899 HIGH RISK MEDICATION USE: ICD-10-CM

## 2019-07-03 DIAGNOSIS — M79.642 PAIN IN BOTH HANDS: ICD-10-CM

## 2019-07-03 DIAGNOSIS — M06.00 SERONEGATIVE RHEUMATOID ARTHRITIS (H): ICD-10-CM

## 2019-07-03 DIAGNOSIS — M17.11 OSTEOARTHRITIS OF RIGHT KNEE, UNSPECIFIED OSTEOARTHRITIS TYPE: ICD-10-CM

## 2019-07-03 DIAGNOSIS — M79.7 FIBROMYALGIA: ICD-10-CM

## 2019-07-03 DIAGNOSIS — G89.29 CHRONIC PAIN OF RIGHT KNEE: ICD-10-CM

## 2019-07-15 ENCOUNTER — INFUSION - HEALTHEAST (OUTPATIENT)
Dept: INFUSION THERAPY | Facility: CLINIC | Age: 69
End: 2019-07-15

## 2019-07-15 DIAGNOSIS — M06.09 SERONEGATIVE RHEUMATOID ARTHRITIS OF MULTIPLE SITES (H): ICD-10-CM

## 2019-08-14 ENCOUNTER — INFUSION - HEALTHEAST (OUTPATIENT)
Dept: INFUSION THERAPY | Facility: CLINIC | Age: 69
End: 2019-08-14

## 2019-08-14 DIAGNOSIS — M06.09 SERONEGATIVE RHEUMATOID ARTHRITIS OF MULTIPLE SITES (H): ICD-10-CM

## 2019-08-14 LAB
ALBUMIN SERPL-MCNC: 3.4 G/DL (ref 3.5–5)
ALT SERPL W P-5'-P-CCNC: 24 U/L (ref 0–45)
AST SERPL W P-5'-P-CCNC: 16 U/L (ref 0–40)
CREAT SERPL-MCNC: 0.77 MG/DL (ref 0.6–1.1)
ERYTHROCYTE [DISTWIDTH] IN BLOOD BY AUTOMATED COUNT: 15.3 % (ref 11–14.5)
GFR SERPL CREATININE-BSD FRML MDRD: >60 ML/MIN/1.73M2
HCT VFR BLD AUTO: 42 % (ref 35–47)
HGB BLD-MCNC: 13.7 G/DL (ref 12–16)
MCH RBC QN AUTO: 31.4 PG (ref 27–34)
MCHC RBC AUTO-ENTMCNC: 32.6 G/DL (ref 32–36)
MCV RBC AUTO: 96 FL (ref 80–100)
PLATELET # BLD AUTO: 325 THOU/UL (ref 140–440)
PMV BLD AUTO: 10.4 FL (ref 8.5–12.5)
RBC # BLD AUTO: 4.36 MILL/UL (ref 3.8–5.4)
WBC: 7.7 THOU/UL (ref 4–11)

## 2019-08-14 RX ORDER — MECOBALAMIN 5000 MCG
15 TABLET,DISINTEGRATING ORAL DAILY
Status: SHIPPED | COMMUNITY
Start: 2019-08-14 | End: 2021-09-14

## 2019-09-10 ENCOUNTER — COMMUNICATION - HEALTHEAST (OUTPATIENT)
Dept: RHEUMATOLOGY | Facility: CLINIC | Age: 69
End: 2019-09-10

## 2019-09-10 DIAGNOSIS — Z79.899 HIGH RISK MEDICATION USE: ICD-10-CM

## 2019-09-13 ENCOUNTER — INFUSION - HEALTHEAST (OUTPATIENT)
Dept: INFUSION THERAPY | Facility: CLINIC | Age: 69
End: 2019-09-13

## 2019-09-13 DIAGNOSIS — M06.09 SERONEGATIVE RHEUMATOID ARTHRITIS OF MULTIPLE SITES (H): ICD-10-CM

## 2019-09-17 ENCOUNTER — COMMUNICATION - HEALTHEAST (OUTPATIENT)
Dept: RHEUMATOLOGY | Facility: CLINIC | Age: 69
End: 2019-09-17

## 2019-09-17 DIAGNOSIS — M06.00 SERONEGATIVE RHEUMATOID ARTHRITIS (H): ICD-10-CM

## 2019-09-23 ENCOUNTER — COMMUNICATION - HEALTHEAST (OUTPATIENT)
Dept: RHEUMATOLOGY | Facility: CLINIC | Age: 69
End: 2019-09-23

## 2019-09-24 ENCOUNTER — COMMUNICATION - HEALTHEAST (OUTPATIENT)
Dept: RHEUMATOLOGY | Facility: CLINIC | Age: 69
End: 2019-09-24

## 2019-09-24 DIAGNOSIS — M06.9 RHEUMATOID ARTHRITIS (H): ICD-10-CM

## 2019-10-14 ENCOUNTER — INFUSION - HEALTHEAST (OUTPATIENT)
Dept: INFUSION THERAPY | Facility: CLINIC | Age: 69
End: 2019-10-14

## 2019-10-14 DIAGNOSIS — M06.09 SERONEGATIVE RHEUMATOID ARTHRITIS OF MULTIPLE SITES (H): ICD-10-CM

## 2019-10-14 LAB
ALBUMIN SERPL-MCNC: 3.2 G/DL (ref 3.5–5)
ALT SERPL W P-5'-P-CCNC: 23 U/L (ref 0–45)
AST SERPL W P-5'-P-CCNC: 17 U/L (ref 0–40)
CREAT SERPL-MCNC: 0.65 MG/DL (ref 0.6–1.1)
ERYTHROCYTE [DISTWIDTH] IN BLOOD BY AUTOMATED COUNT: 14.6 % (ref 11–14.5)
GFR SERPL CREATININE-BSD FRML MDRD: >60 ML/MIN/1.73M2
HCT VFR BLD AUTO: 39.2 % (ref 35–47)
HGB BLD-MCNC: 12.9 G/DL (ref 12–16)
MCH RBC QN AUTO: 31.5 PG (ref 27–34)
MCHC RBC AUTO-ENTMCNC: 32.9 G/DL (ref 32–36)
MCV RBC AUTO: 96 FL (ref 80–100)
PLATELET # BLD AUTO: 286 THOU/UL (ref 140–440)
PMV BLD AUTO: 10.8 FL (ref 8.5–12.5)
RBC # BLD AUTO: 4.1 MILL/UL (ref 3.8–5.4)
WBC: 9.1 THOU/UL (ref 4–11)

## 2019-11-04 ENCOUNTER — OFFICE VISIT - HEALTHEAST (OUTPATIENT)
Dept: RHEUMATOLOGY | Facility: CLINIC | Age: 69
End: 2019-11-04

## 2019-11-04 ENCOUNTER — COMMUNICATION - HEALTHEAST (OUTPATIENT)
Dept: RHEUMATOLOGY | Facility: CLINIC | Age: 69
End: 2019-11-04

## 2019-11-04 DIAGNOSIS — M06.00 SERONEGATIVE RHEUMATOID ARTHRITIS (H): ICD-10-CM

## 2019-11-04 ASSESSMENT — MIFFLIN-ST. JEOR: SCORE: 1529

## 2019-11-15 ENCOUNTER — COMMUNICATION - HEALTHEAST (OUTPATIENT)
Dept: RHEUMATOLOGY | Facility: CLINIC | Age: 69
End: 2019-11-15

## 2019-11-18 ENCOUNTER — AMBULATORY - HEALTHEAST (OUTPATIENT)
Dept: RHEUMATOLOGY | Facility: CLINIC | Age: 69
End: 2019-11-18

## 2019-11-21 ENCOUNTER — INFUSION - HEALTHEAST (OUTPATIENT)
Dept: INFUSION THERAPY | Facility: CLINIC | Age: 69
End: 2019-11-21

## 2019-11-21 DIAGNOSIS — M06.09 SERONEGATIVE RHEUMATOID ARTHRITIS OF MULTIPLE SITES (H): ICD-10-CM

## 2019-12-02 ENCOUNTER — RECORDS - HEALTHEAST (OUTPATIENT)
Dept: LAB | Facility: CLINIC | Age: 69
End: 2019-12-02

## 2019-12-03 ENCOUNTER — COMMUNICATION - HEALTHEAST (OUTPATIENT)
Dept: RHEUMATOLOGY | Facility: CLINIC | Age: 69
End: 2019-12-03

## 2019-12-03 DIAGNOSIS — Z79.899 HIGH RISK MEDICATION USE: ICD-10-CM

## 2019-12-03 LAB — BACTERIA SPEC CULT: NO GROWTH

## 2019-12-17 ENCOUNTER — RECORDS - HEALTHEAST (OUTPATIENT)
Dept: LAB | Facility: CLINIC | Age: 69
End: 2019-12-17

## 2019-12-17 LAB
ALBUMIN SERPL-MCNC: 3.6 G/DL (ref 3.5–5)
ALP SERPL-CCNC: 85 U/L (ref 45–120)
ALT SERPL W P-5'-P-CCNC: 22 U/L (ref 0–45)
ANION GAP SERPL CALCULATED.3IONS-SCNC: 11 MMOL/L (ref 5–18)
AST SERPL W P-5'-P-CCNC: 16 U/L (ref 0–40)
BILIRUB SERPL-MCNC: 0.3 MG/DL (ref 0–1)
BUN SERPL-MCNC: 25 MG/DL (ref 8–22)
CALCIUM SERPL-MCNC: 9.6 MG/DL (ref 8.5–10.5)
CHLORIDE BLD-SCNC: 101 MMOL/L (ref 98–107)
CHOLEST SERPL-MCNC: 168 MG/DL
CO2 SERPL-SCNC: 26 MMOL/L (ref 22–31)
CREAT SERPL-MCNC: 0.68 MG/DL (ref 0.6–1.1)
FASTING STATUS PATIENT QL REPORTED: NO
GFR SERPL CREATININE-BSD FRML MDRD: >60 ML/MIN/1.73M2
GLUCOSE BLD-MCNC: 98 MG/DL (ref 70–125)
HDLC SERPL-MCNC: 57 MG/DL
LDLC SERPL CALC-MCNC: 90 MG/DL
MAGNESIUM SERPL-MCNC: 1.6 MG/DL (ref 1.8–2.6)
POTASSIUM BLD-SCNC: 4.2 MMOL/L (ref 3.5–5)
PROT SERPL-MCNC: 6.6 G/DL (ref 6–8)
SODIUM SERPL-SCNC: 138 MMOL/L (ref 136–145)
TRIGL SERPL-MCNC: 104 MG/DL
TSH SERPL DL<=0.005 MIU/L-ACNC: 2.1 UIU/ML (ref 0.3–5)
VIT B12 SERPL-MCNC: 733 PG/ML (ref 213–816)

## 2019-12-20 ENCOUNTER — COMMUNICATION - HEALTHEAST (OUTPATIENT)
Dept: ADMINISTRATIVE | Facility: HOSPITAL | Age: 69
End: 2019-12-20

## 2019-12-20 ENCOUNTER — COMMUNICATION - HEALTHEAST (OUTPATIENT)
Dept: ADMINISTRATIVE | Facility: CLINIC | Age: 69
End: 2019-12-20

## 2019-12-24 ENCOUNTER — OFFICE VISIT - HEALTHEAST (OUTPATIENT)
Dept: RHEUMATOLOGY | Facility: CLINIC | Age: 69
End: 2019-12-24

## 2019-12-24 DIAGNOSIS — Z79.899 HIGH RISK MEDICATION USE: ICD-10-CM

## 2019-12-24 DIAGNOSIS — M06.00 SERONEGATIVE RHEUMATOID ARTHRITIS (H): ICD-10-CM

## 2020-02-24 ENCOUNTER — COMMUNICATION - HEALTHEAST (OUTPATIENT)
Dept: RHEUMATOLOGY | Facility: CLINIC | Age: 70
End: 2020-02-24

## 2020-02-24 DIAGNOSIS — Z79.899 HIGH RISK MEDICATION USE: ICD-10-CM

## 2020-02-27 ENCOUNTER — INFUSION - HEALTHEAST (OUTPATIENT)
Dept: INFUSION THERAPY | Facility: CLINIC | Age: 70
End: 2020-02-27

## 2020-02-27 DIAGNOSIS — M06.09 SERONEGATIVE RHEUMATOID ARTHRITIS OF MULTIPLE SITES (H): ICD-10-CM

## 2020-03-03 ENCOUNTER — COMMUNICATION - HEALTHEAST (OUTPATIENT)
Dept: ADMINISTRATIVE | Facility: CLINIC | Age: 70
End: 2020-03-03

## 2020-03-03 DIAGNOSIS — M06.09 SERONEGATIVE RHEUMATOID ARTHRITIS OF MULTIPLE SITES (H): ICD-10-CM

## 2020-03-04 ENCOUNTER — COMMUNICATION - HEALTHEAST (OUTPATIENT)
Dept: RHEUMATOLOGY | Facility: CLINIC | Age: 70
End: 2020-03-04

## 2020-03-04 ENCOUNTER — OFFICE VISIT - HEALTHEAST (OUTPATIENT)
Dept: RHEUMATOLOGY | Facility: CLINIC | Age: 70
End: 2020-03-04

## 2020-03-04 DIAGNOSIS — M17.11 OSTEOARTHRITIS OF RIGHT KNEE, UNSPECIFIED OSTEOARTHRITIS TYPE: ICD-10-CM

## 2020-03-04 DIAGNOSIS — M25.561 CHRONIC PAIN OF RIGHT KNEE: ICD-10-CM

## 2020-03-04 DIAGNOSIS — G89.29 CHRONIC PAIN OF RIGHT KNEE: ICD-10-CM

## 2020-03-04 DIAGNOSIS — M06.09 SERONEGATIVE RHEUMATOID ARTHRITIS OF MULTIPLE SITES (H): ICD-10-CM

## 2020-03-04 LAB
ALBUMIN SERPL-MCNC: 3.8 G/DL (ref 3.5–5)
ALT SERPL W P-5'-P-CCNC: 26 U/L (ref 0–45)
AST SERPL W P-5'-P-CCNC: 18 U/L (ref 0–40)
CREAT SERPL-MCNC: 0.71 MG/DL (ref 0.6–1.1)
ERYTHROCYTE [DISTWIDTH] IN BLOOD BY AUTOMATED COUNT: 12 % (ref 11–14.5)
GFR SERPL CREATININE-BSD FRML MDRD: >60 ML/MIN/1.73M2
HCT VFR BLD AUTO: 44.1 % (ref 35–47)
HGB BLD-MCNC: 14.7 G/DL (ref 12–16)
MCH RBC QN AUTO: 31.7 PG (ref 27–34)
MCHC RBC AUTO-ENTMCNC: 33.3 G/DL (ref 32–36)
MCV RBC AUTO: 95 FL (ref 80–100)
PLATELET # BLD AUTO: 301 THOU/UL (ref 140–440)
PMV BLD AUTO: 9 FL (ref 7–10)
RBC # BLD AUTO: 4.63 MILL/UL (ref 3.8–5.4)
WBC: 8.6 THOU/UL (ref 4–11)

## 2020-03-04 ASSESSMENT — MIFFLIN-ST. JEOR: SCORE: 1514.48

## 2020-03-19 ENCOUNTER — COMMUNICATION - HEALTHEAST (OUTPATIENT)
Dept: RHEUMATOLOGY | Facility: CLINIC | Age: 70
End: 2020-03-19

## 2020-03-19 DIAGNOSIS — Z79.899 HIGH RISK MEDICATION USE: ICD-10-CM

## 2020-04-30 ENCOUNTER — COMMUNICATION - HEALTHEAST (OUTPATIENT)
Dept: RHEUMATOLOGY | Facility: CLINIC | Age: 70
End: 2020-04-30

## 2020-04-30 ENCOUNTER — INFUSION - HEALTHEAST (OUTPATIENT)
Dept: INFUSION THERAPY | Facility: CLINIC | Age: 70
End: 2020-04-30

## 2020-04-30 DIAGNOSIS — M06.09 SERONEGATIVE RHEUMATOID ARTHRITIS OF MULTIPLE SITES (H): ICD-10-CM

## 2020-04-30 LAB
ALBUMIN SERPL-MCNC: 3.5 G/DL (ref 3.5–5)
ALT SERPL W P-5'-P-CCNC: 26 U/L (ref 0–45)
AST SERPL W P-5'-P-CCNC: 20 U/L (ref 0–40)
CREAT SERPL-MCNC: 0.73 MG/DL (ref 0.6–1.1)
ERYTHROCYTE [DISTWIDTH] IN BLOOD BY AUTOMATED COUNT: 14.6 % (ref 11–14.5)
GFR SERPL CREATININE-BSD FRML MDRD: >60 ML/MIN/1.73M2
HCT VFR BLD AUTO: 40.6 % (ref 35–47)
HGB BLD-MCNC: 13.4 G/DL (ref 12–16)
MCH RBC QN AUTO: 31.4 PG (ref 27–34)
MCHC RBC AUTO-ENTMCNC: 33 G/DL (ref 32–36)
MCV RBC AUTO: 95 FL (ref 80–100)
PLATELET # BLD AUTO: 305 THOU/UL (ref 140–440)
PMV BLD AUTO: 11.4 FL (ref 8.5–12.5)
RBC # BLD AUTO: 4.27 MILL/UL (ref 3.8–5.4)
WBC: 8.4 THOU/UL (ref 4–11)

## 2020-06-02 ENCOUNTER — INFUSION - HEALTHEAST (OUTPATIENT)
Dept: INFUSION THERAPY | Facility: CLINIC | Age: 70
End: 2020-06-02

## 2020-06-02 DIAGNOSIS — M06.09 SERONEGATIVE RHEUMATOID ARTHRITIS OF MULTIPLE SITES (H): ICD-10-CM

## 2020-06-04 ENCOUNTER — RECORDS - HEALTHEAST (OUTPATIENT)
Dept: LAB | Facility: CLINIC | Age: 70
End: 2020-06-04

## 2020-06-04 LAB
ANION GAP SERPL CALCULATED.3IONS-SCNC: 14 MMOL/L (ref 5–18)
BUN SERPL-MCNC: 17 MG/DL (ref 8–22)
CALCIUM SERPL-MCNC: 9 MG/DL (ref 8.5–10.5)
CHLORIDE BLD-SCNC: 105 MMOL/L (ref 98–107)
CO2 SERPL-SCNC: 23 MMOL/L (ref 22–31)
CREAT SERPL-MCNC: 0.72 MG/DL (ref 0.6–1.1)
GFR SERPL CREATININE-BSD FRML MDRD: >60 ML/MIN/1.73M2
GLUCOSE BLD-MCNC: 100 MG/DL (ref 70–125)
MAGNESIUM SERPL-MCNC: 1.5 MG/DL (ref 1.8–2.6)
POTASSIUM BLD-SCNC: 4 MMOL/L (ref 3.5–5)
SODIUM SERPL-SCNC: 142 MMOL/L (ref 136–145)
VIT B12 SERPL-MCNC: 1719 PG/ML (ref 213–816)

## 2020-06-08 ENCOUNTER — COMMUNICATION - HEALTHEAST (OUTPATIENT)
Dept: RHEUMATOLOGY | Facility: CLINIC | Age: 70
End: 2020-06-08

## 2020-06-08 DIAGNOSIS — Z79.899 HIGH RISK MEDICATION USE: ICD-10-CM

## 2020-06-08 RX ORDER — FOLIC ACID 1 MG/1
3000 TABLET ORAL DAILY
Qty: 270 TABLET | Refills: 0 | Status: SHIPPED | OUTPATIENT
Start: 2020-06-08

## 2020-06-11 ENCOUNTER — COMMUNICATION - HEALTHEAST (OUTPATIENT)
Dept: RHEUMATOLOGY | Facility: CLINIC | Age: 70
End: 2020-06-11

## 2020-06-11 DIAGNOSIS — M06.9 RHEUMATOID ARTHRITIS (H): ICD-10-CM

## 2020-07-07 ENCOUNTER — INFUSION - HEALTHEAST (OUTPATIENT)
Dept: INFUSION THERAPY | Facility: CLINIC | Age: 70
End: 2020-07-07

## 2020-07-07 DIAGNOSIS — M06.09 SERONEGATIVE RHEUMATOID ARTHRITIS OF MULTIPLE SITES (H): ICD-10-CM

## 2020-08-06 ENCOUNTER — INFUSION - HEALTHEAST (OUTPATIENT)
Dept: INFUSION THERAPY | Facility: CLINIC | Age: 70
End: 2020-08-06

## 2020-08-06 DIAGNOSIS — M06.09 SERONEGATIVE RHEUMATOID ARTHRITIS OF MULTIPLE SITES (H): ICD-10-CM

## 2020-08-06 LAB
ALBUMIN SERPL-MCNC: 3.4 G/DL (ref 3.5–5)
ALT SERPL W P-5'-P-CCNC: 28 U/L (ref 0–45)
AST SERPL W P-5'-P-CCNC: 19 U/L (ref 0–40)
CREAT SERPL-MCNC: 0.75 MG/DL (ref 0.6–1.1)
ERYTHROCYTE [DISTWIDTH] IN BLOOD BY AUTOMATED COUNT: 13.9 % (ref 11–14.5)
GFR SERPL CREATININE-BSD FRML MDRD: >60 ML/MIN/1.73M2
HCT VFR BLD AUTO: 40.9 % (ref 35–47)
HGB BLD-MCNC: 13.7 G/DL (ref 12–16)
MCH RBC QN AUTO: 32.1 PG (ref 27–34)
MCHC RBC AUTO-ENTMCNC: 33.5 G/DL (ref 32–36)
MCV RBC AUTO: 96 FL (ref 80–100)
PLATELET # BLD AUTO: 278 THOU/UL (ref 140–440)
PMV BLD AUTO: 11.4 FL (ref 8.5–12.5)
RBC # BLD AUTO: 4.27 MILL/UL (ref 3.8–5.4)
WBC: 9 THOU/UL (ref 4–11)

## 2020-09-09 ENCOUNTER — COMMUNICATION - HEALTHEAST (OUTPATIENT)
Dept: RHEUMATOLOGY | Facility: CLINIC | Age: 70
End: 2020-09-09

## 2020-09-09 ENCOUNTER — INFUSION - HEALTHEAST (OUTPATIENT)
Dept: INFUSION THERAPY | Facility: CLINIC | Age: 70
End: 2020-09-09

## 2020-09-09 DIAGNOSIS — M06.00 SERONEGATIVE RHEUMATOID ARTHRITIS (H): ICD-10-CM

## 2020-09-09 DIAGNOSIS — M06.09 SERONEGATIVE RHEUMATOID ARTHRITIS OF MULTIPLE SITES (H): ICD-10-CM

## 2020-10-09 ENCOUNTER — INFUSION - HEALTHEAST (OUTPATIENT)
Dept: INFUSION THERAPY | Facility: CLINIC | Age: 70
End: 2020-10-09

## 2020-10-09 DIAGNOSIS — M06.09 SERONEGATIVE RHEUMATOID ARTHRITIS OF MULTIPLE SITES (H): ICD-10-CM

## 2020-11-09 ENCOUNTER — INFUSION - HEALTHEAST (OUTPATIENT)
Dept: INFUSION THERAPY | Facility: CLINIC | Age: 70
End: 2020-11-09

## 2020-11-09 DIAGNOSIS — M06.09 SERONEGATIVE RHEUMATOID ARTHRITIS OF MULTIPLE SITES (H): ICD-10-CM

## 2020-12-10 ENCOUNTER — COMMUNICATION - HEALTHEAST (OUTPATIENT)
Dept: ADMINISTRATIVE | Facility: CLINIC | Age: 70
End: 2020-12-10

## 2020-12-21 ENCOUNTER — INFUSION - HEALTHEAST (OUTPATIENT)
Dept: INFUSION THERAPY | Facility: CLINIC | Age: 70
End: 2020-12-21

## 2020-12-21 DIAGNOSIS — M06.09 SERONEGATIVE RHEUMATOID ARTHRITIS OF MULTIPLE SITES (H): ICD-10-CM

## 2020-12-21 LAB
ALBUMIN SERPL-MCNC: 3.6 G/DL (ref 3.5–5)
ALT SERPL W P-5'-P-CCNC: 35 U/L (ref 0–45)
AST SERPL W P-5'-P-CCNC: 19 U/L (ref 0–40)
CREAT SERPL-MCNC: 0.78 MG/DL (ref 0.6–1.1)
ERYTHROCYTE [DISTWIDTH] IN BLOOD BY AUTOMATED COUNT: 13.6 % (ref 11–14.5)
GFR SERPL CREATININE-BSD FRML MDRD: >60 ML/MIN/1.73M2
HCT VFR BLD AUTO: 42.5 % (ref 35–47)
HGB BLD-MCNC: 13.8 G/DL (ref 12–16)
MCH RBC QN AUTO: 30.9 PG (ref 27–34)
MCHC RBC AUTO-ENTMCNC: 32.5 G/DL (ref 32–36)
MCV RBC AUTO: 95 FL (ref 80–100)
PLATELET # BLD AUTO: 300 THOU/UL (ref 140–440)
PMV BLD AUTO: 11.5 FL (ref 8.5–12.5)
RBC # BLD AUTO: 4.47 MILL/UL (ref 3.8–5.4)
WBC: 9.5 THOU/UL (ref 4–11)

## 2020-12-29 ENCOUNTER — COMMUNICATION - HEALTHEAST (OUTPATIENT)
Dept: RHEUMATOLOGY | Facility: CLINIC | Age: 70
End: 2020-12-29

## 2020-12-29 DIAGNOSIS — M06.00 SERONEGATIVE RHEUMATOID ARTHRITIS (H): ICD-10-CM

## 2021-01-18 ENCOUNTER — COMMUNICATION - HEALTHEAST (OUTPATIENT)
Dept: RHEUMATOLOGY | Facility: CLINIC | Age: 71
End: 2021-01-18

## 2021-01-19 ENCOUNTER — INFUSION - HEALTHEAST (OUTPATIENT)
Dept: INFUSION THERAPY | Facility: CLINIC | Age: 71
End: 2021-01-19

## 2021-01-19 DIAGNOSIS — M06.09 SERONEGATIVE RHEUMATOID ARTHRITIS OF MULTIPLE SITES (H): ICD-10-CM

## 2021-01-21 ENCOUNTER — COMMUNICATION - HEALTHEAST (OUTPATIENT)
Dept: RHEUMATOLOGY | Facility: CLINIC | Age: 71
End: 2021-01-21

## 2021-01-21 ENCOUNTER — OFFICE VISIT - HEALTHEAST (OUTPATIENT)
Dept: RHEUMATOLOGY | Facility: CLINIC | Age: 71
End: 2021-01-21

## 2021-01-21 DIAGNOSIS — M06.09 SERONEGATIVE RHEUMATOID ARTHRITIS OF MULTIPLE SITES (H): ICD-10-CM

## 2021-01-21 DIAGNOSIS — M17.11 OSTEOARTHRITIS OF RIGHT KNEE, UNSPECIFIED OSTEOARTHRITIS TYPE: ICD-10-CM

## 2021-01-21 DIAGNOSIS — Z79.899 HIGH RISK MEDICATION USE: ICD-10-CM

## 2021-01-21 DIAGNOSIS — M25.561 CHRONIC PAIN OF RIGHT KNEE: ICD-10-CM

## 2021-01-21 DIAGNOSIS — G89.29 CHRONIC PAIN OF RIGHT KNEE: ICD-10-CM

## 2021-01-25 ENCOUNTER — COMMUNICATION - HEALTHEAST (OUTPATIENT)
Dept: RHEUMATOLOGY | Facility: CLINIC | Age: 71
End: 2021-01-25

## 2021-01-25 DIAGNOSIS — M06.00 SERONEGATIVE RHEUMATOID ARTHRITIS (H): ICD-10-CM

## 2021-01-27 ENCOUNTER — RECORDS - HEALTHEAST (OUTPATIENT)
Dept: LAB | Facility: CLINIC | Age: 71
End: 2021-01-27

## 2021-01-27 LAB
ANION GAP SERPL CALCULATED.3IONS-SCNC: 9 MMOL/L (ref 5–18)
BUN SERPL-MCNC: 17 MG/DL (ref 8–22)
CALCIUM SERPL-MCNC: 9.2 MG/DL (ref 8.5–10.5)
CHLORIDE BLD-SCNC: 101 MMOL/L (ref 98–107)
CO2 SERPL-SCNC: 30 MMOL/L (ref 22–31)
CREAT SERPL-MCNC: 0.73 MG/DL (ref 0.6–1.1)
GFR SERPL CREATININE-BSD FRML MDRD: >60 ML/MIN/1.73M2
GLUCOSE BLD-MCNC: 96 MG/DL (ref 70–125)
MAGNESIUM SERPL-MCNC: 1.6 MG/DL (ref 1.8–2.6)
POTASSIUM BLD-SCNC: 4 MMOL/L (ref 3.5–5)
SODIUM SERPL-SCNC: 140 MMOL/L (ref 136–145)
TSH SERPL DL<=0.005 MIU/L-ACNC: 3.06 UIU/ML (ref 0.3–5)
VIT B12 SERPL-MCNC: 1917 PG/ML (ref 213–816)

## 2021-02-08 ENCOUNTER — OFFICE VISIT - HEALTHEAST (OUTPATIENT)
Dept: RHEUMATOLOGY | Facility: CLINIC | Age: 71
End: 2021-02-08

## 2021-02-08 DIAGNOSIS — M17.11 OSTEOARTHRITIS OF RIGHT KNEE, UNSPECIFIED OSTEOARTHRITIS TYPE: ICD-10-CM

## 2021-02-08 DIAGNOSIS — G89.29 CHRONIC PAIN OF RIGHT KNEE: ICD-10-CM

## 2021-02-08 DIAGNOSIS — M25.561 CHRONIC PAIN OF RIGHT KNEE: ICD-10-CM

## 2021-02-08 ASSESSMENT — MIFFLIN-ST. JEOR: SCORE: 1531.26

## 2021-02-17 ENCOUNTER — COMMUNICATION - HEALTHEAST (OUTPATIENT)
Dept: ADMINISTRATIVE | Facility: CLINIC | Age: 71
End: 2021-02-17

## 2021-02-18 ENCOUNTER — COMMUNICATION - HEALTHEAST (OUTPATIENT)
Dept: RHEUMATOLOGY | Facility: CLINIC | Age: 71
End: 2021-02-18

## 2021-02-18 ENCOUNTER — INFUSION - HEALTHEAST (OUTPATIENT)
Dept: INFUSION THERAPY | Facility: CLINIC | Age: 71
End: 2021-02-18

## 2021-02-18 DIAGNOSIS — M06.00 SERONEGATIVE RHEUMATOID ARTHRITIS (H): ICD-10-CM

## 2021-02-18 DIAGNOSIS — M06.09 SERONEGATIVE RHEUMATOID ARTHRITIS OF MULTIPLE SITES (H): ICD-10-CM

## 2021-02-18 LAB
ALBUMIN SERPL-MCNC: 3.4 G/DL (ref 3.5–5)
ALT SERPL W P-5'-P-CCNC: 28 U/L (ref 0–45)
AST SERPL W P-5'-P-CCNC: 19 U/L (ref 0–40)
BASOPHILS # BLD AUTO: 0 THOU/UL (ref 0–0.2)
BASOPHILS NFR BLD AUTO: 0 % (ref 0–2)
CREAT SERPL-MCNC: 0.74 MG/DL (ref 0.6–1.1)
EOSINOPHIL # BLD AUTO: 0.1 THOU/UL (ref 0–0.4)
EOSINOPHIL NFR BLD AUTO: 1 % (ref 0–6)
ERYTHROCYTE [DISTWIDTH] IN BLOOD BY AUTOMATED COUNT: 14.1 % (ref 11–14.5)
GFR SERPL CREATININE-BSD FRML MDRD: >60 ML/MIN/1.73M2
HCT VFR BLD AUTO: 40.4 % (ref 35–47)
HGB BLD-MCNC: 13.2 G/DL (ref 12–16)
IMM GRANULOCYTES # BLD: 0 THOU/UL
IMM GRANULOCYTES NFR BLD: 0 %
LYMPHOCYTES # BLD AUTO: 3 THOU/UL (ref 0.8–4.4)
LYMPHOCYTES NFR BLD AUTO: 31 % (ref 20–40)
MCH RBC QN AUTO: 30.9 PG (ref 27–34)
MCHC RBC AUTO-ENTMCNC: 32.7 G/DL (ref 32–36)
MCV RBC AUTO: 95 FL (ref 80–100)
MONOCYTES # BLD AUTO: 0.9 THOU/UL (ref 0–0.9)
MONOCYTES NFR BLD AUTO: 10 % (ref 2–10)
NEUTROPHILS # BLD AUTO: 5.5 THOU/UL (ref 2–7.7)
NEUTROPHILS NFR BLD AUTO: 58 % (ref 50–70)
PLATELET # BLD AUTO: 298 THOU/UL (ref 140–440)
PMV BLD AUTO: 11.6 FL (ref 8.5–12.5)
RBC # BLD AUTO: 4.27 MILL/UL (ref 3.8–5.4)
WBC: 9.6 THOU/UL (ref 4–11)

## 2021-02-23 ENCOUNTER — COMMUNICATION - HEALTHEAST (OUTPATIENT)
Dept: RHEUMATOLOGY | Facility: CLINIC | Age: 71
End: 2021-02-23

## 2021-02-23 DIAGNOSIS — M06.9 RHEUMATOID ARTHRITIS (H): ICD-10-CM

## 2021-02-23 RX ORDER — NEEDLES, SAFETY 22GX1 1/2"
NEEDLE, DISPOSABLE MISCELLANEOUS
Qty: 12 SYRINGE | Refills: 0 | Status: SHIPPED | OUTPATIENT
Start: 2021-02-23

## 2021-03-19 ENCOUNTER — COMMUNICATION - HEALTHEAST (OUTPATIENT)
Dept: ADMINISTRATIVE | Facility: CLINIC | Age: 71
End: 2021-03-19

## 2021-03-22 ENCOUNTER — INFUSION - HEALTHEAST (OUTPATIENT)
Dept: INFUSION THERAPY | Facility: CLINIC | Age: 71
End: 2021-03-22

## 2021-03-22 DIAGNOSIS — M06.09 SERONEGATIVE RHEUMATOID ARTHRITIS OF MULTIPLE SITES (H): ICD-10-CM

## 2021-04-07 ENCOUNTER — AMBULATORY - HEALTHEAST (OUTPATIENT)
Dept: PHARMACY | Facility: HOSPITAL | Age: 71
End: 2021-04-07

## 2021-04-13 ENCOUNTER — COMMUNICATION - HEALTHEAST (OUTPATIENT)
Dept: RHEUMATOLOGY | Facility: CLINIC | Age: 71
End: 2021-04-13

## 2021-04-13 DIAGNOSIS — M06.00 SERONEGATIVE RHEUMATOID ARTHRITIS (H): ICD-10-CM

## 2021-04-21 ENCOUNTER — INFUSION - HEALTHEAST (OUTPATIENT)
Dept: INFUSION THERAPY | Facility: CLINIC | Age: 71
End: 2021-04-21

## 2021-04-21 DIAGNOSIS — M06.09 SERONEGATIVE RHEUMATOID ARTHRITIS OF MULTIPLE SITES (H): ICD-10-CM

## 2021-05-05 ENCOUNTER — RECORDS - HEALTHEAST (OUTPATIENT)
Dept: RHEUMATOLOGY | Facility: CLINIC | Age: 71
End: 2021-05-05

## 2021-05-05 DIAGNOSIS — M06.09 SERONEGATIVE RHEUMATOID ARTHRITIS OF MULTIPLE SITES (H): ICD-10-CM

## 2021-05-06 ENCOUNTER — COMMUNICATION - HEALTHEAST (OUTPATIENT)
Dept: RHEUMATOLOGY | Facility: CLINIC | Age: 71
End: 2021-05-06

## 2021-05-06 DIAGNOSIS — M06.00 SERONEGATIVE RHEUMATOID ARTHRITIS (H): ICD-10-CM

## 2021-05-21 ENCOUNTER — INFUSION - HEALTHEAST (OUTPATIENT)
Dept: INFUSION THERAPY | Facility: CLINIC | Age: 71
End: 2021-05-21

## 2021-05-21 DIAGNOSIS — M06.09 SERONEGATIVE RHEUMATOID ARTHRITIS OF MULTIPLE SITES (H): ICD-10-CM

## 2021-05-21 LAB
ALBUMIN SERPL-MCNC: 3.4 G/DL (ref 3.5–5)
ALT SERPL W P-5'-P-CCNC: 23 U/L (ref 0–45)
AST SERPL W P-5'-P-CCNC: 24 U/L (ref 0–40)
BASOPHILS # BLD AUTO: 0 THOU/UL (ref 0–0.2)
BASOPHILS NFR BLD AUTO: 1 % (ref 0–2)
CREAT SERPL-MCNC: 0.73 MG/DL (ref 0.6–1.1)
EOSINOPHIL # BLD AUTO: 0.2 THOU/UL (ref 0–0.4)
EOSINOPHIL NFR BLD AUTO: 2 % (ref 0–6)
ERYTHROCYTE [DISTWIDTH] IN BLOOD BY AUTOMATED COUNT: 14.3 % (ref 11–14.5)
GFR SERPL CREATININE-BSD FRML MDRD: >60 ML/MIN/1.73M2
HCT VFR BLD AUTO: 39.8 % (ref 35–47)
HGB BLD-MCNC: 13.1 G/DL (ref 12–16)
IMM GRANULOCYTES # BLD: 0 THOU/UL
IMM GRANULOCYTES NFR BLD: 0 %
LYMPHOCYTES # BLD AUTO: 3.2 THOU/UL (ref 0.8–4.4)
LYMPHOCYTES NFR BLD AUTO: 37 % (ref 20–40)
MCH RBC QN AUTO: 31 PG (ref 27–34)
MCHC RBC AUTO-ENTMCNC: 32.9 G/DL (ref 32–36)
MCV RBC AUTO: 94 FL (ref 80–100)
MONOCYTES # BLD AUTO: 1 THOU/UL (ref 0–0.9)
MONOCYTES NFR BLD AUTO: 12 % (ref 2–10)
NEUTROPHILS # BLD AUTO: 4.1 THOU/UL (ref 2–7.7)
NEUTROPHILS NFR BLD AUTO: 48 % (ref 50–70)
PLATELET # BLD AUTO: 307 THOU/UL (ref 140–440)
PMV BLD AUTO: 11.4 FL (ref 8.5–12.5)
RBC # BLD AUTO: 4.23 MILL/UL (ref 3.8–5.4)
WBC: 8.5 THOU/UL (ref 4–11)

## 2021-05-24 ENCOUNTER — RECORDS - HEALTHEAST (OUTPATIENT)
Dept: ADMINISTRATIVE | Facility: CLINIC | Age: 71
End: 2021-05-24

## 2021-05-25 ENCOUNTER — RECORDS - HEALTHEAST (OUTPATIENT)
Dept: ADMINISTRATIVE | Facility: CLINIC | Age: 71
End: 2021-05-25

## 2021-05-26 VITALS
DIASTOLIC BLOOD PRESSURE: 75 MMHG | HEART RATE: 99 BPM | OXYGEN SATURATION: 94 % | TEMPERATURE: 98.6 F | SYSTOLIC BLOOD PRESSURE: 119 MMHG

## 2021-05-26 VITALS
SYSTOLIC BLOOD PRESSURE: 116 MMHG | TEMPERATURE: 97.7 F | DIASTOLIC BLOOD PRESSURE: 73 MMHG | HEART RATE: 67 BPM | OXYGEN SATURATION: 96 %

## 2021-05-26 VITALS
HEART RATE: 97 BPM | SYSTOLIC BLOOD PRESSURE: 149 MMHG | OXYGEN SATURATION: 95 % | DIASTOLIC BLOOD PRESSURE: 74 MMHG | TEMPERATURE: 98.4 F

## 2021-05-27 ENCOUNTER — RECORDS - HEALTHEAST (OUTPATIENT)
Dept: ADMINISTRATIVE | Facility: CLINIC | Age: 71
End: 2021-05-27

## 2021-05-27 VITALS
SYSTOLIC BLOOD PRESSURE: 127 MMHG | DIASTOLIC BLOOD PRESSURE: 60 MMHG | OXYGEN SATURATION: 96 % | HEART RATE: 90 BPM | TEMPERATURE: 97.9 F

## 2021-05-27 VITALS
DIASTOLIC BLOOD PRESSURE: 64 MMHG | HEART RATE: 92 BPM | TEMPERATURE: 98.1 F | OXYGEN SATURATION: 99 % | SYSTOLIC BLOOD PRESSURE: 136 MMHG

## 2021-05-27 VITALS
OXYGEN SATURATION: 97 % | HEART RATE: 102 BPM | SYSTOLIC BLOOD PRESSURE: 138 MMHG | TEMPERATURE: 98.4 F | DIASTOLIC BLOOD PRESSURE: 84 MMHG

## 2021-05-27 VITALS
SYSTOLIC BLOOD PRESSURE: 120 MMHG | OXYGEN SATURATION: 96 % | DIASTOLIC BLOOD PRESSURE: 70 MMHG | TEMPERATURE: 98 F | HEART RATE: 84 BPM

## 2021-05-27 VITALS
HEART RATE: 99 BPM | SYSTOLIC BLOOD PRESSURE: 132 MMHG | DIASTOLIC BLOOD PRESSURE: 73 MMHG | OXYGEN SATURATION: 96 % | TEMPERATURE: 98 F

## 2021-05-27 VITALS
TEMPERATURE: 98.2 F | SYSTOLIC BLOOD PRESSURE: 123 MMHG | OXYGEN SATURATION: 99 % | HEART RATE: 90 BPM | DIASTOLIC BLOOD PRESSURE: 66 MMHG

## 2021-05-27 VITALS
HEART RATE: 97 BPM | OXYGEN SATURATION: 95 % | TEMPERATURE: 98 F | DIASTOLIC BLOOD PRESSURE: 79 MMHG | SYSTOLIC BLOOD PRESSURE: 122 MMHG

## 2021-05-27 VITALS — SYSTOLIC BLOOD PRESSURE: 162 MMHG | DIASTOLIC BLOOD PRESSURE: 93 MMHG | HEART RATE: 96 BPM | OXYGEN SATURATION: 94 %

## 2021-05-27 NOTE — TELEPHONE ENCOUNTER
Central PA team  [phone]  Pool: HE PA MED ([id number])          PA has been initiated.       PA form completed and faxed insurance via Cover My Meds     Key:  [id number]     Medication:  TRAMADOL 50MG    Insurance:  [employer] [facility]        Response will be received via fax and may take up to 5-10 business days depending on plan

## 2021-05-27 NOTE — PATIENT INSTRUCTIONS - HE
Summary of Your Rheumatology Visit    Next Appointment:  3 Months    Medications:    Please follow directives on pill bottle on how to take medication(s) provided.      Referrals:      Tests:        Injections:    After 4/24/19,  as discussed, you can schedule an injection visit for synvisc 1 involving the following regions: R knee

## 2021-05-27 NOTE — TELEPHONE ENCOUNTER
traMADol (ULTRAM) 50 mg tablet 75 tablet 2 4/3/2019  --   Sig: Take 1 tablet p.o.bid-three times a day, prn for moderate to severe pain.   Sent to pharmacy as: traMADol (ULTRAM) 50 mg tablet     Please initiate PA for above medication.

## 2021-05-27 NOTE — PROGRESS NOTES
Marlin Alejo who presents today with a chief complaint of  Follow-up, RA      Joint Pains: Yes  Location:  Hands, low back, knees  Onset: chronic   Intensity: 8 /10  AM Stiffness: 30-60 Minutes  Alleviating/Aggravating Factors:  Tolerating Meds: yes  Other:      ROS:  Patient denies having any chest pain, shortness of breath, cough, abdominal pain, nausea, vomiting, rashes, fevers, oral ulcers and recent infections.  Patient admits to having a good appetite      Problem List:  Patient Active Problem List   Diagnosis     Osteoporosis     Fibromyalgia     Morbid obesity (H)     Essential hypertension     Postoperative malabsorption     Frequent UTI     Primary osteoarthritis of both hands     Lumbar spondylosis     High risk medication use     Seronegative rheumatoid arthritis of multiple sites (H)     Chronic pain of right knee     Bilateral primary osteoarthritis of knee        PMH:   Past Medical History:   Diagnosis Date     Arthritis of back      Fibromyalgia, primary      Hand swelling      Osteoarthritis      Osteoporosis        Surgical History:  Past Surgical History:   Procedure Laterality Date     ANKLE LIGAMENT RECONSTRUCTION  1988     APPENDECTOMY  1972     BACK SURGERY       CERVICAL CIRCLAGES X3       CHOLECYSTECTOMY  2005     CYSTOCELE REPAIR  2005     EXPLORATORY LAPAROTOMY  1982    Right partial salpingectomy     gastric bypass  2007    Keesha-en-y     hemmorhoidectomy  2005     HYSTERECTOMY  1986    at age 36     LAMINECTOMY  2006    L3-4, L4-5     OOPHORECTOMY Bilateral 1986     PANNICULECTOMY  2009     POSTERIOR LAMINECTOMY / DECOMPRESSION LUMBAR SPINE  2009    L4-5     REPAIR RECTOCELE  2005     Right knee arthoscopy  2002       Family History:  Family History   Problem Relation Age of Onset     Heart disease Mother      Colon cancer Father 68     Arthritis Sister      Hypertension Sister      Heart attack Brother      Heart disease Brother      Breast cancer Cousin 40     Cancer Unknown       Leukemia Unknown      Stroke Unknown      Heart disease Unknown      Hypertension Unknown      BRCA 1/2 Neg Hx      Endometrial cancer Neg Hx      Ovarian cancer Neg Hx        Social History:   reports that  has never smoked. she has never used smokeless tobacco. She reports that she does not drink alcohol or use drugs.    Allergies:  Allergies   Allergen Reactions     Bee Sting Kit      swelling     Codeine Hives     Mental changes     Lemonade Flavor      Oral sores     Neurontin [Gabapentin] Dizziness     GI upset     Nortriptyline Swelling     Nsaids (Non-Steroidal Anti-Inflammatory Drug)      S/p anastasia-en-y     Oxycodone Nausea And Vomiting     GENERIC (BRAND OKAY TO TAKE)     Strawberry      Oral sores     Talwin [Pentazocine Lactate] Nausea And Vomiting and Dizziness     Ultracet [Tramadol-Acetaminophen] Dizziness     Unable To Assess      Red Pepper - throat swelling       Ampicillin Rash        Current Medications:  Current Outpatient Medications   Medication Sig Dispense Refill     abatacept (ORENCIA, WITH MALTOSE,) 250 mg injection Infuse 1,000 mg into a venous catheter every 28 days. 1000 mg 5     ACETAMINOPHEN (TYLENOL ARTHRITIS ORAL) Take 650 mg by mouth daily as needed.       aspirin 81 MG EC tablet Take 40.5 mg by mouth 3 (three) times a week. 1/2 tab (40.5mg)       b complex vitamins tablet Take 1 tablet by mouth 3 (three) times a day.       calcium-vitamin D3-vitamin K (VIACTIV) 500-500-40 mg-unit-mcg Chew per chewable tablet Chew 1 tablet 2 (two) times a day.       cetirizine (ZYRTEC) 10 MG tablet Take 10 mg by mouth daily.       cholecalciferol, vitamin D3, (VITAMIN D3) 1,000 unit capsule Take 1,000 Units by mouth daily.       cranberry fruit (CRANBERRY) 475 mg cap Take 2 capsules by mouth daily.       cyanocobalamin, vitamin B-12, (VITAMIN B-12) 2,500 mcg Subl Place 1 tablet under the tongue daily.       cyclobenzaprine (FLEXERIL) 10 MG tablet Take 10 mg by mouth 3 (three) times a day as needed  "for muscle spasms.       DENOSUMAB (PROLIA SUBQ) Inject 60 mg under the skin every 6 (six) months.       docusate sodium (STOOL SOFTENER) 100 MG capsule Take 200 mg by mouth bedtime.       DULoxetine (CYMBALTA) 30 MG capsule TAKE ONE CAPSULE DAILY WITH 60MG CAPSULE  3     DULoxetine (CYMBALTA) 60 MG capsule Take 90 mg by mouth bedtime.        EPINEPHrine (EPIPEN) 0.3 mg/0.3 mL (1:1,000) atIn Inject into the shoulder, thigh, or buttocks once.       fluticasone (FLONASE) 50 mcg/actuation nasal spray 1 spray into each nostril as needed.              folic acid (FOLVITE) 1 MG tablet TAKE 3 TABLETS (3 MG TOTAL) BY MOUTH DAILY. 270 tablet 1     hydroCHLOROthiazide (HYDRODIURIL) 12.5 MG tablet Take 12.5 mg by mouth daily.       levothyroxine (SYNTHROID, LEVOTHROID) 50 MCG tablet Take 50 mcg by mouth daily.       lidocaine (LIDODERM) 5 % Apply 1/2 to 1 patch over low back regions prn,  for up to 12 hrs. Then wait 12 hrs prior to applying another patch. 30 patch 2     lidocaine (XYLOCAINE) 5 % ointment Apply topically as needed.       magnesium oxide 250 mg Tab Take 250 mg by mouth 2 (two) times a day.       methotrexate 25 mg/mL injection Inject 0.8 mL (20 mg total) under the skin every 7 days. 3.5 mL 0     mirabegron (MYRBETRIQ) 25 mg Tb24 ER tablet Take 50 mg by mouth daily.        MULTIVITAMIN ORAL Take 1 tablet by mouth 2 times a day at 6:00 am and 4:00 pm. Flinstone chewables       pantoprazole (PROTONIX) 20 MG tablet Take 20 mg by mouth daily.       polyethylene glycol (MIRALAX) 17 gram packet Take 17 g by mouth daily as needed.       sulfamethoxazole-trimethoprim (SEPTRA DS) 800-160 mg per tablet Take 1 tablet by mouth 2 (two) times a day Prn after intercourse.             syringe with needle (BD TUBERCULIN SYRINGE) 1 mL 27 x 1/2\" Syrg USE 1 SYRINGE AS DIRECTED ONCE A WEEK. 12 Syringe 0     syringe, disposable, 1 mL Syrg Please provide 1 mL, 27-gauge syringes for injectable methotrexate use. 50 Syringe 0     trospium " "(SANCTURA) 20 mg tablet Take 20 mg by mouth 2 (two) times a day.       vitamin E 400 UNIT capsule Take 400 Units by mouth 2 (two) times a day.       Medical Cannabis Use 1 Units As Directed. Take as instructed by the medical cannabis dispensary. The provider who enrolled the patient in the medical cannabis registry and certified this  patient will maintain ownership and liability of all provider reporting and registration requirements.       No current facility-administered medications for this visit.            Physical Exam:  /64 (Patient Site: Right Arm, Patient Position: Sitting, Cuff Size: Adult Large)   Pulse 90   Ht 5' 1.5\" (1.562 m)   Wt (!) 233 lb 9.6 oz (106 kg)   BMI 43.42 kg/m    General: A & O x 3 in NAD  HEENT: EOMI, Non injected/non icteric sclera, no oral lesions noted  CV: s1s2 with RRR, no rubs appreciated   Lungs: CTA B/L, no wheezing , rales or rhonci appreciated  GI: Soft, NT/ND, no rebound, no guarding noted  MS: Positive mild discomfort involving right knee on passive flexion/extension, no clear signs of synovitis noted.  Negative straight leg raising bilaterally. Otherwise patient demonstrated good passive/active ROM over other joints with no warmth, erythema, tenderness or synovitis noted over these joints.      Summary/Assessment:    History that includes seronegative rheumatoid arthritis, osteoarthritis, osteoporosis, fibromyalgia, gastric bypass, chronic low back pains.    Presents for follow-up visit.      Patient's RA appears to be stable at this time with combination of methotrexate and Orencia.    Synvisc 1 has been helpful for right knee however pain has been resurfacing.      Has chronic low back pains.  Takes Tylenol and Flexeril (from another provider) twice daily with some benefit.    Currently not taking any narcotics, was on OxyContin in the past.  Desires additional medication for pain relief.  Tried medical marijuana, ineffective.    Please see below for management " "plan.      Pertinent rheumatology/past medical history (please refer to above for more detailed history):      Seronegative rheumatoid arthritis    High-risk medication use    Osteoarthritis    Fibromyalgia    History gastric bypass    Osteoporosis    Chronic low back pain/status post surgical fusion L4-L5 (history of spinal stenosis).        Rheumatology medications provided/suggested:    Methotrexate injectable  Orencia infusion  Tramadol  Synvisc 1      Pertinent medication from other providers or from otc (please refer to above for more detailed med list):    Flexeril  Cymbalta  Prolia  HCTZ   Vitamin D      Pertinent medications already tried:     Eufflexa (not helpful)  Medical marijuana      Pertinent lab history:    Noted to have negative/unremarkable: Rheumatoid factor, CCP antibody, LEONEL.      Pertinent imaging/test history:        Other:    Has a standing order for labs every 3 months good through December 2019.    On prior visit, suggested looking into obtaining a paraffin wax machine.    Plan:      Continue injectable methotrexate 0.8 mL weekly.      Continue folic acid 3 mg daily, except the day when taking methotrexate.  Higher dosing due to oral ulcers.    Continue Orencia monthly.    Continue Lidoderm patches for low back pains (\"already seen spine clinic\")    We will add tramadol 1 tablet twice daily-3 times daily as needed.  Listed to have experienced some dizziness with Ultracet in the past, desires to try again.    We will avoid oral and topical NSAIDs given history of gastric bypass.    On Prolia for another provider for osteoporosis.    On Cymbalta from another provider for fibromyalgia.    After April 24 2019 can receive repeat Synvisc 1 injection for right knee pains.    Recheck labs in about 10 weeks.    Follow-up in 3 months.      Procedure note:       This note was transcribed using Dragon voice recognition software as a result unintentional grammatical errors or word substitutions may " have occurred. Please contact our Rheumatology department if you need any clarification or if you have any related inquiries.    Major side effect profile of medications provided were discussed with the patient.      Quinten Cerna ....................  4/3/2019   12:00 PM

## 2021-05-28 NOTE — PROGRESS NOTES
Pt was tolerating Orencia well until 15 min into infusion and then c/o pain at PIV site. Infusion stopped. PIV site infiltrated, slightly reddened, swollen; warm compress applied  PIV started opposite arm. Pt tolerated the rest of infusion well.

## 2021-05-29 ENCOUNTER — RECORDS - HEALTHEAST (OUTPATIENT)
Dept: ADMINISTRATIVE | Facility: CLINIC | Age: 71
End: 2021-05-29

## 2021-05-30 ENCOUNTER — RECORDS - HEALTHEAST (OUTPATIENT)
Dept: ADMINISTRATIVE | Facility: CLINIC | Age: 71
End: 2021-05-30

## 2021-05-30 VITALS — BODY MASS INDEX: 45.64 KG/M2 | HEIGHT: 62 IN | WEIGHT: 248 LBS

## 2021-05-30 VITALS — BODY MASS INDEX: 46.14 KG/M2 | WEIGHT: 248.19 LBS

## 2021-05-30 VITALS — WEIGHT: 248 LBS | BODY MASS INDEX: 46.1 KG/M2

## 2021-05-30 VITALS — HEIGHT: 62 IN | BODY MASS INDEX: 45.64 KG/M2 | WEIGHT: 248 LBS

## 2021-05-30 NOTE — PATIENT INSTRUCTIONS - HE
Summary of Your Rheumatology Visit    Next Appointment:  4 Months    Medications:    Please follow directives on Voltaren tube on how to utilize.      Referrals:      Tests:     Please have labs performed in about 6 weeks and every 8 weeks therafter.    Injections:        Other:

## 2021-05-30 NOTE — PROGRESS NOTES
"Marlin Alejo who presents today with a chief complaint of        Joint Pains: Yes  Location: hands, right knee  Onset: chronic(many years)  Intensity:  8/10  AM Stiffness: 60 Minutes  Alleviating/Aggravating Factors: Medications helpful  Tolerating Meds: Yes  Other:      ROS:  Patient denies having any active: chest pain, shortness of breath, +cough \"allergy relayed\", no: abdominal pain, nausea, vomiting, rashes, documented fevers, +oral ulcers (tongue) and no: recent infections.  Patient admits to having a good appetite  Information gathered by medical assistant incorporated into this note, was reviewed and discussed with the patient.    Problem List:  Patient Active Problem List   Diagnosis     Osteoporosis     Fibromyalgia     Morbid obesity (H)     Essential hypertension     Postoperative malabsorption     Frequent UTI     Primary osteoarthritis of both hands     Lumbar spondylosis     High risk medication use     Seronegative rheumatoid arthritis of multiple sites (H)     Chronic pain of right knee     Bilateral primary osteoarthritis of knee        PMH:   Past Medical History:   Diagnosis Date     Arthritis of back      Fibromyalgia, primary      Hand swelling      Osteoarthritis      Osteoporosis        Surgical History:  Past Surgical History:   Procedure Laterality Date     ANKLE LIGAMENT RECONSTRUCTION  1988     APPENDECTOMY  1972     BACK SURGERY       CERVICAL CIRCLAGES X3       CHOLECYSTECTOMY  2005     CYSTOCELE REPAIR  2005     EXPLORATORY LAPAROTOMY  1982    Right partial salpingectomy     gastric bypass  2007    Keesha-en-y     hemmorhoidectomy  2005     HYSTERECTOMY  1986    at age 36     LAMINECTOMY  2006    L3-4, L4-5     OOPHORECTOMY Bilateral 1986     PANNICULECTOMY  2009     POSTERIOR LAMINECTOMY / DECOMPRESSION LUMBAR SPINE  2009    L4-5     REPAIR RECTOCELE  2005     Right knee arthoscopy  2002       Family History:  Family History   Problem Relation Age of Onset     Heart disease Mother  "     Colon cancer Father 68     Arthritis Sister      Hypertension Sister      Heart attack Brother      Heart disease Brother      Breast cancer Cousin 40     Cancer Unknown      Leukemia Unknown      Stroke Unknown      Heart disease Unknown      Hypertension Unknown      BRCA 1/2 Neg Hx      Endometrial cancer Neg Hx      Ovarian cancer Neg Hx        Social History:   reports that she has never smoked. She has never used smokeless tobacco. She reports that she does not drink alcohol or use drugs.    Allergies:  Allergies   Allergen Reactions     Bee Sting Kit      swelling     Codeine Hives     Mental changes     Lemonade Flavor      Oral sores     Neurontin [Gabapentin] Dizziness     GI upset     Nortriptyline Swelling     Nsaids (Non-Steroidal Anti-Inflammatory Drug)      S/p anastasia-en-y     Oxycodone Nausea And Vomiting     GENERIC (BRAND OKAY TO TAKE)     Strawberry      Oral sores     Talwin [Pentazocine Lactate] Nausea And Vomiting and Dizziness     Ultracet [Tramadol-Acetaminophen] Dizziness     Unable To Assess      Red Pepper - throat swelling       Ampicillin Rash        Current Medications:  Current Outpatient Medications   Medication Sig Dispense Refill     abatacept (ORENCIA, WITH MALTOSE,) 250 mg injection Infuse 1,000 mg into a venous catheter every 28 days. 1000 mg 5     ACETAMINOPHEN (TYLENOL ARTHRITIS ORAL) Take 650 mg by mouth daily as needed.       aspirin 81 MG EC tablet Take 40.5 mg by mouth 3 (three) times a week. 1/2 tab (40.5mg)       calcium-vitamin D3-vitamin K (VIACTIV) 500-500-40 mg-unit-mcg Chew per chewable tablet Chew 1 tablet 2 (two) times a day.       cetirizine (ZYRTEC) 10 MG tablet Take 10 mg by mouth daily.       cholecalciferol, vitamin D3, (VITAMIN D3) 1,000 unit capsule Take 1,000 Units by mouth daily.       cyclobenzaprine (FLEXERIL) 10 MG tablet Take 10 mg by mouth 3 (three) times a day as needed for muscle spasms.       DENOSUMAB (PROLIA SUBQ) Inject 60 mg under the skin  "every 6 (six) months.       docusate sodium (STOOL SOFTENER) 100 MG capsule Take 200 mg by mouth bedtime.       DULoxetine (CYMBALTA) 30 MG capsule TAKE ONE CAPSULE DAILY WITH 60MG CAPSULE  3     EPINEPHrine (EPIPEN) 0.3 mg/0.3 mL (1:1,000) atIn Inject into the shoulder, thigh, or buttocks once.       fluticasone (FLONASE) 50 mcg/actuation nasal spray 1 spray into each nostril as needed.              folic acid (FOLVITE) 1 MG tablet Take 3 tablets (3,000 mcg total) by mouth daily. 270 tablet 0     hydroCHLOROthiazide (HYDRODIURIL) 12.5 MG tablet Take 12.5 mg by mouth daily.       levothyroxine (SYNTHROID, LEVOTHROID) 50 MCG tablet Take 50 mcg by mouth daily.       lidocaine (LIDODERM) 5 % Apply 1/2 to 1 patch over low back regions prn,  for up to 12 hrs. Then wait 12 hrs prior to applying another patch. 30 patch 2     magnesium oxide 250 mg Tab Take 250 mg by mouth 2 (two) times a day.       methotrexate 25 mg/mL injection Inject 0.8 mL (20 mg total) under the skin every 7 days. 10.5 mL 0     mirabegron (MYRBETRIQ) 25 mg Tb24 ER tablet Take 50 mg by mouth daily.        MULTIVITAMIN ORAL Take 1 tablet by mouth 2 times a day at 6:00 am and 4:00 pm. Flinstone chewables       polyethylene glycol (MIRALAX) 17 gram packet Take 17 g by mouth daily as needed.       sulfamethoxazole-trimethoprim (SEPTRA DS) 800-160 mg per tablet Take 1 tablet by mouth 2 (two) times a day Prn after intercourse.             syringe with needle (BD TUBERCULIN SYRINGE) 1 mL 27 x 1/2\" Syrg USE 1 SYRINGE AS DIRECTED ONCE A WEEK. 12 Syringe 0     syringe, disposable, 1 mL Syrg Please provide 1 mL, 27-gauge syringes for injectable methotrexate use. 50 Syringe 0     trospium (SANCTURA) 20 mg tablet Take 20 mg by mouth 2 (two) times a day.       UNABLE TO FIND 2 tablets daily. Med Name:       UNABLE TO FIND 2 tablets daily. Med Name:       vitamin E 400 UNIT capsule Take 400 Units by mouth 2 (two) times a day.       b complex vitamins tablet Take 1 " tablet by mouth 3 (three) times a day.       cranberry fruit (CRANBERRY) 475 mg cap Take 2 capsules by mouth daily.       cyanocobalamin, vitamin B-12, (VITAMIN B-12) 2,500 mcg Subl Place 1 tablet under the tongue daily.       DULoxetine (CYMBALTA) 60 MG capsule Take 90 mg by mouth bedtime.        lidocaine (XYLOCAINE) 5 % ointment Apply topically as needed.       Medical Cannabis Use 1 Units As Directed. Take as instructed by the medical cannabis dispensary. The provider who enrolled the patient in the medical cannabis registry and certified this  patient will maintain ownership and liability of all provider reporting and registration requirements.       pantoprazole (PROTONIX) 20 MG tablet Take 20 mg by mouth daily.       peg 400-propylene glycol (SYSTANE) 0.4-0.3 % Drop Administer 1 drop to both eyes 4 (four) times a day as needed.       traMADol (ULTRAM) 50 mg tablet Take 1 tablet p.o.bid-three times a day, prn for moderate to severe pain. 75 tablet 2     No current facility-administered medications for this visit.            Physical Exam:  /78 (Patient Site: Right Arm, Patient Position: Sitting, Cuff Size: Adult Large)   Pulse 76   Wt (!) 237 lb 11.2 oz (107.8 kg)   BMI 44.19 kg/m    General: A & O x 3 in NAD  HEENT: EOMI, Non injected/non icteric sclera, small oral ulcer noted involving right upper buccal mucosa region.  CV: s1s2 with RRR, no rubs appreciated   Lungs: CTA B/L, no wheezing , rales or rhonci appreciated  GI: Soft, NT/ND, no rebound, no guarding noted  MS: Positive discomfort involving right second MCP joint fifth PIP joints with no clear signs of synovitis noted.  Positive discomfort involving right knee on passive flexion/extension, no clear signs of synovitis noted.  Otherwise patient demonstrated good passive/active ROM over other joints with no warmth, erythema, tenderness or synovitis noted over these joints.        Summary/Assessment:    History that includes seronegative  "rheumatoid arthritis, osteoarthritis, osteoporosis, fibromyalgia, gastric bypass, chronic low back pains.    Presents for follow-up visit.      Has been expensing resurfacing right knee pain and desires repeat Synvisc 1 injection, helpful in the past.    Has occasional hand pains.    Recurrent oral ulcers improved with increasing folic acid to 3 mg daily.    Patient's RA appears to be stable at this time with combination of methotrexate and Orencia.    Has chronic low back pains.  Takes Tylenol and Flexeril (from another provider) twice daily with some benefit.    Please see below for management plan.      Pertinent rheumatology/past medical history (please refer to above for more detailed history):      Seronegative rheumatoid arthritis    High-risk medication use    Osteoarthritis    Fibromyalgia    History gastric bypass    Osteoporosis    Chronic low back pain/status post surgical fusion L4-L5 (history of spinal stenosis).        Rheumatology medications provided/suggested:    Methotrexate injectable  Orencia infusion  Synvisc 1      Pertinent medication from other providers or from otc (please refer to above for more detailed med list):    Flexeril  Cymbalta  Prolia  HCTZ   Vitamin D      Pertinent medications already tried:     Eufflexa (not helpful)  Medical marijuana  Tramadol    Pertinent lab history:    Noted to have negative/unremarkable: Rheumatoid factor, CCP antibody, LEONEL.      Pertinent imaging/test history:        Other:    Has a standing order for labs every 3 months good through December 2019.    On prior visit, suggested looking into obtaining a paraffin wax machine.    Plan:      We will inject right knee with Synvisc 1 today.    Continue injectable methotrexate 0.8 mL weekly.      Continue folic acid 3 mg daily, except the day when taking methotrexate.  Higher dosing due to oral ulcers.    Continue Orencia monthly.    Continue Lidoderm patches for low back pains (\"already seen spine " "clinic\")    We will add Voltaren gel for hand pains.    We will avoid oral NSAIDs given history of gastric bypass.    On Prolia for another provider for osteoporosis.    On Cymbalta from another provider for fibromyalgia.    Recheck labs in about 6 weeks and every 8 weeks thereafter, has labs at the infusion suite.    Follow-up in 4 months.      Procedure note:     Consent to treat form signed by the patient after risks and benefits discussed the patient.  Patient's right knee was prepped in a sterile manner with  alcohol swab and ChloraPrep applicator.  Injected 11/2 mL of lidocaine followed by Synvisc 1 into right knee.  Patient tolerated the procedure well with no complications noted. Patient was advised to place ice over injection sites if sore over the next 24-48 hours. [facility] assisted with procedure(s).        Spent 40 minutes with greater than half of this time spent with the patient going over differential diagnosis, prognosis, treatment plan, medication side effects and  answering questions.      This note was transcribed using Dragon voice recognition software as a result unintentional grammatical errors or word substitutions may have occurred. Please contact our Rheumatology department if you need any clarification or if you have any related inquiries.    Major side effect profile of medications provided were discussed with the patient.      Quinten Cerna DO ....................  7/3/2019   4:15 PM    "

## 2021-05-31 ENCOUNTER — RECORDS - HEALTHEAST (OUTPATIENT)
Dept: ADMINISTRATIVE | Facility: CLINIC | Age: 71
End: 2021-05-31

## 2021-05-31 VITALS — BODY MASS INDEX: 43.43 KG/M2 | HEIGHT: 62 IN | WEIGHT: 236 LBS

## 2021-05-31 VITALS — BODY MASS INDEX: 44.06 KG/M2 | WEIGHT: 237 LBS

## 2021-05-31 VITALS — WEIGHT: 243.8 LBS | BODY MASS INDEX: 45.32 KG/M2

## 2021-05-31 VITALS — BODY MASS INDEX: 45.54 KG/M2 | WEIGHT: 245 LBS

## 2021-06-01 ENCOUNTER — RECORDS - HEALTHEAST (OUTPATIENT)
Dept: ADMINISTRATIVE | Facility: CLINIC | Age: 71
End: 2021-06-01

## 2021-06-01 VITALS — HEIGHT: 62 IN | WEIGHT: 232 LBS | BODY MASS INDEX: 42.69 KG/M2

## 2021-06-01 VITALS — BODY MASS INDEX: 44.46 KG/M2 | WEIGHT: 239.19 LBS

## 2021-06-01 VITALS — WEIGHT: 232 LBS | HEIGHT: 62 IN | BODY MASS INDEX: 42.69 KG/M2

## 2021-06-01 VITALS — WEIGHT: 236.8 LBS | BODY MASS INDEX: 44.02 KG/M2

## 2021-06-01 NOTE — TELEPHONE ENCOUNTER
Prior Authorization Request  Who s requesting:  Pharmacy  Pharmacy Name and Location: [facility] pharmacy   Medication Name: methotrexate vial  Insurance Plan: [id number]  Insurance Member ID Number:  [id number] [facility] # [id number]  Informed patient that prior authorizations can take up to 10 business days for response:   No  Okay to leave a detailed message: No

## 2021-06-01 NOTE — PROGRESS NOTES
Pt here today for Orencia infusion. IV in left forearm. She tolerated it well and left clinic ambulatory with .

## 2021-06-01 NOTE — TELEPHONE ENCOUNTER
Central PA team  [phone]  Pool: HE PA MED ([id number])          PA has been initiated.       PA form completed and faxed insurance via Cover My Meds     Key:  [id number]       Medication:  methotrexate 25 mg/mL injection    Insurance:  [facility]        Response will be received via fax and may take up to 5-10 business days depending on plan

## 2021-06-02 ENCOUNTER — OFFICE VISIT - HEALTHEAST (OUTPATIENT)
Dept: RHEUMATOLOGY | Facility: CLINIC | Age: 71
End: 2021-06-02

## 2021-06-02 ENCOUNTER — RECORDS - HEALTHEAST (OUTPATIENT)
Dept: ADMINISTRATIVE | Facility: CLINIC | Age: 71
End: 2021-06-02

## 2021-06-02 VITALS — HEIGHT: 62 IN | BODY MASS INDEX: 42.99 KG/M2 | WEIGHT: 233.6 LBS

## 2021-06-02 VITALS — WEIGHT: 232 LBS | BODY MASS INDEX: 43.13 KG/M2

## 2021-06-02 VITALS — WEIGHT: 230 LBS | BODY MASS INDEX: 42.75 KG/M2

## 2021-06-02 DIAGNOSIS — M25.562 CHRONIC PAIN OF BOTH KNEES: ICD-10-CM

## 2021-06-02 DIAGNOSIS — M17.0 OSTEOARTHRITIS OF BOTH KNEES, UNSPECIFIED OSTEOARTHRITIS TYPE: ICD-10-CM

## 2021-06-02 DIAGNOSIS — M06.09 SERONEGATIVE RHEUMATOID ARTHRITIS OF MULTIPLE SITES (H): ICD-10-CM

## 2021-06-02 DIAGNOSIS — M79.7 FIBROMYALGIA: ICD-10-CM

## 2021-06-02 DIAGNOSIS — G89.29 CHRONIC PAIN OF BOTH KNEES: ICD-10-CM

## 2021-06-02 DIAGNOSIS — Z79.899 HIGH RISK MEDICATION USE: ICD-10-CM

## 2021-06-02 DIAGNOSIS — M25.561 CHRONIC PAIN OF BOTH KNEES: ICD-10-CM

## 2021-06-02 NOTE — PROGRESS NOTES
Pt here today for Orencia, labs, and flu shot. She tolerated everything well and left clinic with walker and .

## 2021-06-03 ENCOUNTER — COMMUNICATION - HEALTHEAST (OUTPATIENT)
Dept: RHEUMATOLOGY | Facility: CLINIC | Age: 71
End: 2021-06-03

## 2021-06-03 VITALS
WEIGHT: 234.5 LBS | HEART RATE: 100 BPM | BODY MASS INDEX: 43.15 KG/M2 | SYSTOLIC BLOOD PRESSURE: 122 MMHG | DIASTOLIC BLOOD PRESSURE: 78 MMHG | HEIGHT: 62 IN

## 2021-06-03 VITALS
HEART RATE: 112 BPM | SYSTOLIC BLOOD PRESSURE: 143 MMHG | BODY MASS INDEX: 43.68 KG/M2 | TEMPERATURE: 97.2 F | WEIGHT: 235 LBS | OXYGEN SATURATION: 94 % | DIASTOLIC BLOOD PRESSURE: 78 MMHG

## 2021-06-03 VITALS — WEIGHT: 235.6 LBS | BODY MASS INDEX: 43.8 KG/M2

## 2021-06-03 VITALS — WEIGHT: 233 LBS | BODY MASS INDEX: 43.31 KG/M2

## 2021-06-03 VITALS — BODY MASS INDEX: 43.68 KG/M2 | WEIGHT: 235 LBS

## 2021-06-03 VITALS — BODY MASS INDEX: 44.19 KG/M2 | WEIGHT: 237.7 LBS

## 2021-06-03 DIAGNOSIS — M06.00 SERONEGATIVE RHEUMATOID ARTHRITIS (H): ICD-10-CM

## 2021-06-03 NOTE — PATIENT INSTRUCTIONS - HE
Summary of Your Rheumatology Visit    Next Appointment:  4 Months    Medications:    Continue medications provided, as discussed.      Referrals:      Tests:     Please have labs performed in about 8 weeks.       Injections:        Other:

## 2021-06-03 NOTE — TELEPHONE ENCOUNTER
Attempted to call - once dialed regularly, second time dialed with a 1 before area code - busy signal both times.

## 2021-06-03 NOTE — PROGRESS NOTES
"Marlin Alejo who presents today with a chief complaint of  Follow-up      Joint Pains: Yes  Location: Right Knee  Onset: chronic  Intensity: 7 /10  AM Stiffness: 60 Minutes  Alleviating/Aggravating Factors: cyclobenzaprine and Tylenol is helpful  Tolerating Meds: Yes  Other:      ROS:  Patient denies having any active: chest pain, has \"uri causing\": +shortness of breath, +cough, +abdominal pain, +nausea, vomiting, rashes, documented fevers, +oral ulcers and recent infections.  Patient admits to having a good appetite  Information gathered by medical assistant incorporated into this note, was reviewed and discussed with the patient.    Problem List:  Patient Active Problem List   Diagnosis     Osteoporosis     Fibromyalgia     Morbid obesity (H)     Essential hypertension     Postoperative malabsorption     Frequent UTI     Primary osteoarthritis of both hands     Lumbar spondylosis     High risk medication use     Seronegative rheumatoid arthritis of multiple sites (H)     Chronic pain of right knee     Bilateral primary osteoarthritis of knee        PMH:   Past Medical History:   Diagnosis Date     Arthritis of back      Fibromyalgia, primary      Hand swelling      Osteoarthritis      Osteoporosis        Surgical History:  Past Surgical History:   Procedure Laterality Date     ANKLE LIGAMENT RECONSTRUCTION  1988     APPENDECTOMY  1972     BACK SURGERY       CERVICAL CIRCLAGES X3       CHOLECYSTECTOMY  2005     CYSTOCELE REPAIR  2005     EXPLORATORY LAPAROTOMY  1982    Right partial salpingectomy     gastric bypass  2007    Keesha-en-y     hemmorhoidectomy  2005     HYSTERECTOMY  1986    at age 36     LAMINECTOMY  2006    L3-4, L4-5     OOPHORECTOMY Bilateral 1986     PANNICULECTOMY  2009     POSTERIOR LAMINECTOMY / DECOMPRESSION LUMBAR SPINE  2009    L4-5     REPAIR RECTOCELE  2005     Right knee arthoscopy  2002       Family History:  Family History   Problem Relation Age of Onset     Heart disease Mother      " Colon cancer Father 68     Arthritis Sister      Hypertension Sister      Heart attack Brother      Heart disease Brother      Breast cancer Cousin 40     Cancer Unknown      Leukemia Unknown      Stroke Unknown      Heart disease Unknown      Hypertension Unknown      BRCA 1/2 Neg Hx      Endometrial cancer Neg Hx      Ovarian cancer Neg Hx        Social History:   reports that she has never smoked. She has never used smokeless tobacco. She reports that she does not drink alcohol or use drugs.    Allergies:  Allergies   Allergen Reactions     Bee Sting Kit      swelling     Codeine Hives     Mental changes     Lemonade Flavor      Oral sores     Neurontin [Gabapentin] Dizziness     GI upset     Nortriptyline Swelling     Nsaids (Non-Steroidal Anti-Inflammatory Drug)      S/p anastasia-en-y     Oxycodone Nausea And Vomiting     GENERIC (BRAND OKAY TO TAKE)     Strawberry      Oral sores     Talwin [Pentazocine Lactate] Nausea And Vomiting and Dizziness     Ultracet [Tramadol-Acetaminophen] Dizziness     Unable To Assess      Red Pepper - throat swelling       Ampicillin Rash        Current Medications:  Current Outpatient Medications   Medication Sig Dispense Refill     abatacept (ORENCIA, WITH MALTOSE,) 250 mg injection Infuse 1,000 mg into a venous catheter every 28 days. 1000 mg 5     ACETAMINOPHEN (TYLENOL ARTHRITIS ORAL) Take 650 mg by mouth daily as needed.       aspirin 81 MG EC tablet Take 40.5 mg by mouth 3 (three) times a week. 1/2 tab (40.5mg)       calcium-vitamin D3-vitamin K (VIACTIV) 500-500-40 mg-unit-mcg Chew per chewable tablet Chew 1 tablet 2 (two) times a day.       cetirizine (ZYRTEC) 10 MG tablet Take 10 mg by mouth daily.       cholecalciferol, vitamin D3, (VITAMIN D3) 1,000 unit capsule Take 1,000 Units by mouth daily.       cranberry fruit (CRANBERRY) 475 mg cap Take 2 capsules by mouth daily.       cyclobenzaprine (FLEXERIL) 10 MG tablet Take 10 mg by mouth 3 (three) times a day as needed for  muscle spasms.       DENOSUMAB (PROLIA SUBQ) Inject 60 mg under the skin every 6 (six) months.       diclofenac sodium (VOLTAREN) 1 % Gel Apply approximately 1/2-1 gm over affected hand joints tid-qid, as needed. 1 Tube 1     docusate sodium (STOOL SOFTENER) 100 MG capsule Take 200 mg by mouth bedtime.       DULoxetine (CYMBALTA) 30 MG capsule TAKE ONE CAPSULE DAILY WITH 60MG CAPSULE  3     DULoxetine (CYMBALTA) 60 MG capsule Take 90 mg by mouth bedtime.        EPINEPHrine (EPIPEN) 0.3 mg/0.3 mL (1:1,000) atIn Inject into the shoulder, thigh, or buttocks once.       fluticasone (FLONASE) 50 mcg/actuation nasal spray 1 spray into each nostril as needed.              folic acid (FOLVITE) 1 MG tablet Take 3 tablets (3,000 mcg total) by mouth daily. 270 tablet 0     hydroCHLOROthiazide (HYDRODIURIL) 12.5 MG tablet Take 12.5 mg by mouth daily.       lansoprazole (PREVACID) 15 MG capsule Take 15 mg by mouth daily.       levothyroxine (SYNTHROID, LEVOTHROID) 50 MCG tablet Take 50 mcg by mouth daily.       lidocaine (LIDODERM) 5 % Apply 1/2 to 1 patch over low back regions prn,  for up to 12 hrs. Then wait 12 hrs prior to applying another patch. 30 patch 2     lidocaine (XYLOCAINE) 5 % ointment Apply topically as needed.       magnesium oxide 250 mg Tab Take 250 mg by mouth 2 (two) times a day.       Medical Cannabis Use 1 Units As Directed. Take as instructed by the medical cannabis dispensary. The provider who enrolled the patient in the medical cannabis registry and certified this  patient will maintain ownership and liability of all provider reporting and registration requirements.       methotrexate 25 mg/mL injection Inject 0.8 mL (20 mg total) under the skin every 7 days. 10 mL 0     mirabegron (MYRBETRIQ) 25 mg Tb24 ER tablet Take 50 mg by mouth daily.        MULTIVITAMIN ORAL Take 1 tablet by mouth 2 times a day at 6:00 am and 4:00 pm. Flinstone chewables       pantoprazole (PROTONIX) 20 MG tablet Take 20 mg by  "mouth daily.       peg 400-propylene glycol (SYSTANE) 0.4-0.3 % Drop Administer 1 drop to both eyes 4 (four) times a day as needed.       polyethylene glycol (MIRALAX) 17 gram packet Take 17 g by mouth daily as needed.       sulfamethoxazole-trimethoprim (SEPTRA DS) 800-160 mg per tablet Take 1 tablet by mouth 2 (two) times a day Prn after intercourse.             syringe with needle (BD TUBERCULIN SYRINGE) 1 mL 27 x 1/2\" Syrg USE 1 SYRINGE AS DIRECTED ONCE A WEEK. 12 Syringe 0     syringe, disposable, 1 mL Syrg Please provide 1 mL, 27-gauge syringes for injectable methotrexate use. 50 Syringe 0     traMADol (ULTRAM) 50 mg tablet Take 1 tablet p.o.bid-three times a day, prn for moderate to severe pain. 75 tablet 2     trospium (SANCTURA) 20 mg tablet Take 20 mg by mouth 2 (two) times a day.       UNABLE TO FIND 2 tablets daily. Med Name:       UNABLE TO FIND 2 tablets daily. Med Name:       vitamin E 400 UNIT capsule Take 400 Units by mouth 2 (two) times a day.       b complex vitamins tablet Take 1 tablet by mouth 3 (three) times a day.       cyanocobalamin, vitamin B-12, (VITAMIN B-12) 2,500 mcg Subl Place 1 tablet under the tongue daily.       Current Facility-Administered Medications   Medication Dose Route Frequency Provider Last Rate Last Dose     hyaluronate Syrg 48 mg  6 mL Intra-articular Once Quinten Cerna, DO               Physical Exam:  There were no vitals taken for this visit.  General: A & O x 3 in NAD  HEENT: EOMI, Non injected/non icteric sclera, no oral lesions noted  CV: s1s2 with RRR, no rubs appreciated   Lungs: CTA B/L, no wheezing , rales or rhonci appreciated  GI: Soft, NT/ND, no rebound, no guarding noted  MS: Mild discomfort over left second MCP joint, no synovitis noted.  Passive flexion/extension of right knee did not reproduce any pains today.  Otherwise patient demonstrated good passive/active ROM over other joints with no warmth, erythema, tenderness or synovitis noted " over these joints.      Summary/Assessment:    History that includes seronegative rheumatoid arthritis, osteoarthritis, osteoporosis, fibromyalgia, gastric bypass, chronic low back pains.    Presents for follow-up visit.      Has chronic right knee pains, states Synvisc 1 only provided about 1 month worth of relief which patient was content about.    Patient's RA appears to be stable at this time with combination of methotrexate and Orencia.    Has chronic low back pains.  Takes Tylenol and Flexeril (from another provider) twice daily with some benefit.    Please see below for management plan.      Pertinent rheumatology/past medical history (please refer to above for more detailed history):      Seronegative rheumatoid arthritis    High-risk medication use    Osteoarthritis    Fibromyalgia    History gastric bypass    Osteoporosis    Chronic low back pain/status post surgical fusion L4-L5 (history of spinal stenosis).    Recurrent oral ulcers (folic acid 3 mg daily helpful)        Rheumatology medications provided/suggested:    Methotrexate injectable  Orencia infusion  Synvisc 1      Pertinent medication from other providers or from otc (please refer to above for more detailed med list):    Flexeril  Cymbalta  Prolia  HCTZ   Vitamin D      Pertinent medications already tried:     Eufflexa (not helpful)  Medical marijuana  Tramadol    Pertinent lab history:    Noted to have negative/unremarkable: Rheumatoid factor, CCP antibody, LEONEL.      Pertinent imaging/test history:        Other:    Has a standing order for labs every 3 months good through December 2019.    On prior visit, suggested looking into obtaining a paraffin wax machine.    Plan:        Continue injectable methotrexate 0.8 mL weekly.  Made aware to hold dose if experiencing infection, can restart thereafter.    Continue folic acid 3 mg daily, except the day when taking methotrexate.  Higher dosing due to oral ulcers.    Continue Orencia  "monthly.    Continue Lidoderm patches for low back pains (\"already seen spine clinic\")    Continue Voltaren gel for hand pains.    We will avoid oral NSAIDs given history of gastric bypass.    On Prolia for another provider for osteoporosis.    On Cymbalta from another provider for fibromyalgia.    Recheck labs in 8 weeks    Follow-up in 4 months.      Procedure note:         This note was transcribed using Dragon voice recognition software as a result unintentional grammatical errors or word substitutions may have occurred. Please contact our Rheumatology department if you need any clarification or if you have any related inquiries.        Quinten Cerna, DO        "

## 2021-06-03 NOTE — TELEPHONE ENCOUNTER
Patient has labs performed at infusion suite, please advise to have labs checked in about 8 weeks, as opposed to 12 weeks, thanks.

## 2021-06-03 NOTE — TELEPHONE ENCOUNTER
rx refill request received from [facility] for folic acid.     Last OV 11/4/19  Last lab 10/14/19  Future appt 3/4/20    rx sent for signature.

## 2021-06-04 VITALS
WEIGHT: 231.3 LBS | HEART RATE: 100 BPM | HEIGHT: 62 IN | BODY MASS INDEX: 42.57 KG/M2 | SYSTOLIC BLOOD PRESSURE: 116 MMHG | DIASTOLIC BLOOD PRESSURE: 66 MMHG

## 2021-06-04 VITALS
TEMPERATURE: 98.3 F | WEIGHT: 231.3 LBS | HEART RATE: 81 BPM | BODY MASS INDEX: 43 KG/M2 | DIASTOLIC BLOOD PRESSURE: 65 MMHG | SYSTOLIC BLOOD PRESSURE: 116 MMHG | OXYGEN SATURATION: 95 %

## 2021-06-04 VITALS
TEMPERATURE: 98.1 F | WEIGHT: 233 LBS | SYSTOLIC BLOOD PRESSURE: 123 MMHG | HEART RATE: 110 BPM | BODY MASS INDEX: 43.31 KG/M2 | DIASTOLIC BLOOD PRESSURE: 75 MMHG | OXYGEN SATURATION: 92 %

## 2021-06-04 VITALS
TEMPERATURE: 97.9 F | DIASTOLIC BLOOD PRESSURE: 68 MMHG | SYSTOLIC BLOOD PRESSURE: 114 MMHG | OXYGEN SATURATION: 95 % | WEIGHT: 231.2 LBS | HEART RATE: 77 BPM | BODY MASS INDEX: 42.98 KG/M2

## 2021-06-04 NOTE — PROGRESS NOTES
33 minutes spent reviewing notes/prior records, reviewing labs, medical decision making, discussing with patient, reviewing prior orders, placing orders, documenting associated information.    In addition to holding next Orencia infusion, patient also advised over the phone to hold methotrexate until what sounds like viral infection (URI symptoms with some mild abdominal discomfort) clears.

## 2021-06-05 VITALS
HEIGHT: 62 IN | HEART RATE: 100 BPM | BODY MASS INDEX: 43.24 KG/M2 | SYSTOLIC BLOOD PRESSURE: 132 MMHG | WEIGHT: 235 LBS | DIASTOLIC BLOOD PRESSURE: 70 MMHG

## 2021-06-06 NOTE — TELEPHONE ENCOUNTER
Please place order for Synvisc injection, so PA team can check on insurance.     appt scheduled 3/24/2020

## 2021-06-06 NOTE — PROGRESS NOTES
Marlin Alejo who presents today with a chief complaint of  Follow-up      Joint Pains: Yes  Location: lower back and RT knee  Onset: chronic 30 years   Intensity:  5 /10  AM Stiffness: yes, 0 Minutes  Alleviating/Aggravating Factors:  Yes Medications helpful  Tolerating Meds: Yes tolerate med.   Other:      ROS:  Patient denies having any active: chest pain, shortness of breath,+ cough, abdominal pain, nausea, vomiting, rashes, documented fevers, oral ulcers and recent infections.+Patient admits to having a good appetite.  Information gathered by medical assistant incorporated into this note, was reviewed and discussed with the patient.    Problem List:  Patient Active Problem List   Diagnosis     Osteoporosis     Fibromyalgia     Morbid obesity (H)     Essential hypertension     Postoperative malabsorption     Frequent UTI     Primary osteoarthritis of both hands     Lumbar spondylosis     High risk medication use     Seronegative rheumatoid arthritis of multiple sites (H)     Chronic pain of right knee     Bilateral primary osteoarthritis of knee        PMH:   Past Medical History:   Diagnosis Date     Arthritis of back      Fibromyalgia, primary      Hand swelling      Osteoarthritis      Osteoporosis        Surgical History:  Past Surgical History:   Procedure Laterality Date     ANKLE LIGAMENT RECONSTRUCTION  1988     APPENDECTOMY  1972     BACK SURGERY       CERVICAL CIRCLAGES X3       CHOLECYSTECTOMY  2005     CYSTOCELE REPAIR  2005     EXPLORATORY LAPAROTOMY  1982    Right partial salpingectomy     gastric bypass  2007    Keesha-en-y     hemmorhoidectomy  2005     HYSTERECTOMY  1986    at age 36     LAMINECTOMY  2006    L3-4, L4-5     OOPHORECTOMY Bilateral 1986     PANNICULECTOMY  2009     POSTERIOR LAMINECTOMY / DECOMPRESSION LUMBAR SPINE  2009    L4-5     REPAIR RECTOCELE  2005     Right knee arthoscopy  2002       Family History:  Family History   Problem Relation Age of Onset     Heart disease Mother       Colon cancer Father 68     Arthritis Sister      Hypertension Sister      Heart attack Brother      Heart disease Brother      Breast cancer Cousin 40     Cancer Unknown      Leukemia Unknown      Stroke Unknown      Heart disease Unknown      Hypertension Unknown      BRCA 1/2 Neg Hx      Endometrial cancer Neg Hx      Ovarian cancer Neg Hx        Social History:   reports that she has never smoked. She has never used smokeless tobacco. She reports that she does not drink alcohol or use drugs.    Allergies:  Allergies   Allergen Reactions     Bee Sting Kit      swelling     Codeine Hives     Mental changes     Lemonade Flavor      Oral sores     Neurontin [Gabapentin] Dizziness     GI upset     Nortriptyline Swelling     Nsaids (Non-Steroidal Anti-Inflammatory Drug)      S/p anastasia-en-y     Oxycodone Nausea And Vomiting     GENERIC (BRAND OKAY TO TAKE)     Strawberry      Oral sores     Talwin [Pentazocine Lactate] Nausea And Vomiting and Dizziness     Ultracet [Tramadol-Acetaminophen] Dizziness     Unable To Assess      Red Pepper - throat swelling       Ampicillin Rash        Current Medications:  Current Outpatient Medications   Medication Sig Dispense Refill     abatacept (ORENCIA, WITH MALTOSE,) 250 mg injection Infuse 1,000 mg into a venous catheter every 28 days. 1000 mg 5     ACETAMINOPHEN (TYLENOL ARTHRITIS ORAL) Take 650 mg by mouth daily as needed.       aspirin 81 MG EC tablet Take 40.5 mg by mouth 3 (three) times a week. 1/2 tab (40.5mg)       calcium-vitamin D3-vitamin K (VIACTIV) 500-500-40 mg-unit-mcg Chew per chewable tablet Chew 1 tablet 2 (two) times a day.       cetirizine (ZYRTEC) 10 MG tablet Take 10 mg by mouth daily.       cholecalciferol, vitamin D3, (VITAMIN D3) 1,000 unit capsule Take 1,000 Units by mouth daily.       cyanocobalamin, vitamin B-12, (VITAMIN B-12) 2,500 mcg Subl Place 1 tablet under the tongue daily.       cyclobenzaprine (FLEXERIL) 10 MG tablet Take 10 mg by mouth 3  "(three) times a day as needed for muscle spasms.       DENOSUMAB (PROLIA SUBQ) Inject 60 mg under the skin every 6 (six) months.       diclofenac sodium (VOLTAREN) 1 % Gel Apply approximately 1/2-1 gm over affected hand joints tid-qid, as needed. 1 Tube 1     docusate sodium (STOOL SOFTENER) 100 MG capsule Take 200 mg by mouth bedtime.       DULoxetine (CYMBALTA) 30 MG capsule TAKE ONE CAPSULE DAILY WITH 60MG CAPSULE  3     DULoxetine (CYMBALTA) 60 MG capsule Take 90 mg by mouth bedtime.        EPINEPHrine (EPIPEN) 0.3 mg/0.3 mL (1:1,000) atIn Inject into the shoulder, thigh, or buttocks once.       fluticasone (FLONASE) 50 mcg/actuation nasal spray 1 spray into each nostril as needed.              folic acid (FOLVITE) 1 MG tablet Take 3 tablets (3,000 mcg total) by mouth daily. 90 tablet 0     hydroCHLOROthiazide (HYDRODIURIL) 12.5 MG tablet Take 12.5 mg by mouth daily.       lansoprazole (PREVACID) 15 MG capsule Take 15 mg by mouth daily.       levothyroxine (SYNTHROID, LEVOTHROID) 50 MCG tablet Take 50 mcg by mouth daily.       lidocaine (LIDODERM) 5 % Apply 1/2 to 1 patch over low back regions prn,  for up to 12 hrs. Then wait 12 hrs prior to applying another patch. 30 patch 2     lidocaine (XYLOCAINE) 5 % ointment Apply topically as needed.       magnesium oxide 250 mg Tab Take 250 mg by mouth 2 (two) times a day.       methotrexate 25 mg/mL injection Inject 0.8 mL (20 mg total) under the skin every 7 days. 10 mL 0     mirabegron (MYRBETRIQ) 25 mg Tb24 ER tablet Take 25 mg by mouth daily.        MULTIVITAMIN ORAL Take 1 tablet by mouth 2 times a day at 6:00 am and 4:00 pm. Flinstone chewables       polyethylene glycol (MIRALAX) 17 gram packet Take 17 g by mouth daily as needed.       sulfamethoxazole-trimethoprim (SEPTRA DS) 800-160 mg per tablet Take 1 tablet by mouth 2 (two) times a day Prn after intercourse.             syringe with needle (BD TUBERCULIN SYRINGE) 1 mL 27 x 1/2\" Syrg USE 1 SYRINGE AS DIRECTED " "ONCE A WEEK. 12 Syringe 0     syringe, disposable, 1 mL Syrg Please provide 1 mL, 27-gauge syringes for injectable methotrexate use. 50 Syringe 0     trospium (SANCTURA) 20 mg tablet Take 20 mg by mouth 2 (two) times a day.       UNABLE TO FIND 2 tablets daily. Med Name:       UNABLE TO FIND 2 tablets daily. Med Name:       vitamin E 400 UNIT capsule Take 400 Units by mouth 2 (two) times a day.       b complex vitamins tablet Take 1 tablet by mouth 3 (three) times a day.       cranberry fruit (CRANBERRY) 475 mg cap Take 2 capsules by mouth daily.       Medical Cannabis Use 1 Units As Directed. Take as instructed by the medical cannabis dispensary. The provider who enrolled the patient in the medical cannabis registry and certified this  patient will maintain ownership and liability of all provider reporting and registration requirements.       pantoprazole (PROTONIX) 20 MG tablet Take 20 mg by mouth daily.       peg 400-propylene glycol (SYSTANE) 0.4-0.3 % Drop Administer 1 drop to both eyes 4 (four) times a day as needed.       traMADol (ULTRAM) 50 mg tablet Take 1 tablet p.o.bid-three times a day, prn for moderate to severe pain. 75 tablet 2     Current Facility-Administered Medications   Medication Dose Route Frequency Provider Last Rate Last Dose     hyaluronate Syrg 48 mg  6 mL Intra-articular Once Quinten Cerna,                Physical Exam:  /66   Pulse 100   Ht 5' 1.5\" (1.562 m)   Wt (!) 231 lb 4.8 oz (104.9 kg)   BMI 43.00 kg/m    General: A & O x 3 in NAD  HEENT: EOMI, Non injected/non icteric sclera, no oral lesions noted  CV: s1s2 with RRR, no rubs appreciated   Lungs: CTA B/L, no wheezing , rales or rhonci appreciated  GI: Soft, NT/ND, no rebound, no guarding noted  MS: Positive right knee pain on passive flexion/extension with some mild limitation noted, no warmth erythema or synovitis noted.  Wearing a back brace.  Otherwise patient demonstrated good passive/active ROM over " other joints with no warmth, erythema, tenderness or synovitis noted over these joints.    Summary/Assessment:    History that includes seronegative rheumatoid arthritis, osteoarthritis, osteoporosis, fibromyalgia, gastric bypass, chronic low back pains.    Presents for follow-up visit.      Has been experiencing resurfacing right knee pain, desires repeat injection of Synvisc 1.    Patient's RA appears to be stable at this time with combination of methotrexate and Orencia.      Has chronic low back pains.  Takes Tylenol and Flexeril (from another provider) twice daily with some benefit. Back brace also beneficial.  History low back surgery.    Patient due for repeat labs in about 2 weeks..    Please see below for management plan.      Pertinent rheumatology/past medical history (please refer to above for more detailed history):      Seronegative rheumatoid arthritis    High-risk medication use    Osteoarthritis    Fibromyalgia    History gastric bypass    Osteoporosis    Chronic low back pain/status post surgical fusion L4-L5 (history of spinal stenosis).    Recurrent oral ulcers (folic acid 3 mg daily helpful)      Rheumatology medications provided/suggested:    Methotrexate injectable   Folic acid  Orencia infusion  Synvisc 1      Pertinent medication from other providers or from otc (please refer to above for more detailed med list):    Flexeril  Cymbalta  Prolia  HCTZ   Vitamin D      Pertinent medications already tried:     Eufflexa (not helpful)  Medical marijuana  Tramadol    Pertinent lab history:      Noted to have negative/unremarkable: Rheumatoid factor, CCP antibody, LEONEL.      Pertinent imaging/test history:        Other:    Has a standing order for labs every 3 months good through December 2019.    On prior visit, suggested looking into obtaining a paraffin wax machine.    Plan:        Continue injectable methotrexate 0.8 mL weekly.  Made aware to hold dose if experiencing infection, can restart  "thereafter.    Continue folic acid 3 mg daily, except the day when taking methotrexate.  Higher dosing due to oral ulcers.    Continue Orencia monthly.    Continue Lidoderm patches for low back pains (\"already seen spine clinic\")    Continue Voltaren gel for hand pains.    We will avoid oral NSAIDs given history of gastric bypass.    On Prolia for another provider for osteoporosis.    On Cymbalta from another provider for fibromyalgia.    Recheck labs within 2 weeks    Follow-up in 4 months.      Procedure note:           This note was transcribed using Dragon voice recognition software as a result unintentional grammatical errors or word substitutions may have occurred. Please contact our Rheumatology department if you need any clarification or if you have any related inquiries.    Major side effect profile of medications provided were discussed with the patient.      Quinten Cerna DO....................  3/4/2020   10:54 AM      "

## 2021-06-06 NOTE — PATIENT INSTRUCTIONS - HE
Summary of Your Rheumatology Visit    Next Appointment:  4 Months    Medications:    Continue medications provided, as discussed.      Referrals:      Tests:     Please have labs performed within 2 weeks and 8-12 weeks thereafeter.       Injections:        Other:

## 2021-06-06 NOTE — TELEPHONE ENCOUNTER
Refill request received from [email or web address] for folic acid.     Last OV 11/4/2019  Last lab 10/14/2019  Future appt 3/4/2020

## 2021-06-06 NOTE — TELEPHONE ENCOUNTER
Lab orders entered per RN refill protocol and Dr Cerna's notes, Hilda notified at [facility] infusion that orders were placed.

## 2021-06-07 NOTE — TELEPHONE ENCOUNTER
Pt has an appt scheduled on 5/4/2020 coming in for a synvisc injection.     Please advise if pt should hold injection or should pt keep appt to discuss further management?

## 2021-06-07 NOTE — PROGRESS NOTES
Pt arrives ambulatory with walker to Infusion dept. IV started in left forearm, labs drawn, and flushed. Pt reports no signs or symptoms of infection; /70   Pulse (!) 110   Temp 98.3  F (36.8  C) (Oral)   Wt (!) 231 lb 4.8 oz (104.9 kg)   SpO2 95%   BMI 43.00 kg/m     Pt reports joint pain 5/10, with some tingling in fingers and toes. Pt tolerated orencia infusion well. IV flushed and removed. Pt left department ambulatory with walker.

## 2021-06-07 NOTE — TELEPHONE ENCOUNTER
Refill request received from [facility] for folic acid.     Last OV 3/4/2020  Last lab 3/4/2020  Future appt 7/27/2020

## 2021-06-07 NOTE — TELEPHONE ENCOUNTER
Call and spoke to someone. He said pt is not there [facility] and to call back pt back tomorrow.     Rheumatology team please call pt back tomorrow regarding to MD msg below.

## 2021-06-07 NOTE — TELEPHONE ENCOUNTER
Can postpone May 4 visit for July 2020.  With regards to Synvisc 1 if desires to have performed, she can contact urgent care orthopedics and see if they are willing to provide otherwise, we can place patient on injection list for when we reopen.

## 2021-06-09 NOTE — PROGRESS NOTES
Pt arrived to infusion clinic. IV access established with great blood return. Pt tolerated Orencia infusion well. IV flushed with saline and then removed, gauze and Coban applied. Pt ambulated out of infusion clinic independently.

## 2021-06-09 NOTE — PROGRESS NOTES
Patient arrived ambulatory with walker for Orencia infusion. Tolerated with no complaints/no signs of adverse reaction. IV removed for discharge to home. Patient reports will return as scheduled for next infusion.

## 2021-06-09 NOTE — PROGRESS NOTES
Pt. ambulated into Infusion Care with the use of a cane. Pt. was accompanied by her . Peripheral IV started in left arm and labs drawn. Pt. received an infusion of Orencia without any problems. IV flushed and IV discontinued. Coban dressing appllied and all questions answered.

## 2021-06-10 NOTE — PROGRESS NOTES
ASSESSMENT AND PLAN:  Marlin Alejo 67 y.o. female is here for third Euflexxa injection which is done in her right knee anterolateral approach 20 mg aseptically injected.    Diagnoses and all orders for this visit:    Bilateral primary osteoarthritis of knee        HISTORY OF PRESENTING ILLNESS:  Marlin Alejo 67 y.o. is here for follow up of osteoarthritis.  She is here for the third injection of Euflexxa.  She has tolerated the first 2.  She feels that there is already beginning of some improvement..    Further historical information, including ROS and limitation in   activities as noted in the multidimensional health assessment questionnaire scanned in the EMR and in the assessment and plan section.    ALLERGIES:Bee sting kit; Codeine; Lemonade flavor; Neurontin [gabapentin]; Nortriptyline; Nsaids (non-steroidal anti-inflammatory drug); Oxycodone; Strawberry; Talwin [pentazocine lactate]; Ultracet [tramadol-acetaminophen]; Unable to assess; and Ampicillin    PAST MEDICAL/ACTIVE PROBLEMS/MEDICATION/SOCIAL DATA  Past Medical History:   Diagnosis Date     Arthritis of back      Fibromyalgia, primary      Hand swelling      Osteoarthritis      Osteoporosis      History   Smoking Status     Never Smoker   Smokeless Tobacco     Never Used     Patient Active Problem List   Diagnosis     Osteoporosis     Fibromyalgia     Morbid obesity     Essential hypertension     Postoperative malabsorption     Frequent UTI     Primary osteoarthritis of both hands     Lumbar spondylosis     High risk medication use     Seronegative rheumatoid arthritis of multiple sites     Chronic pain of right knee     Bilateral primary osteoarthritis of knee     Current Outpatient Prescriptions   Medication Sig Dispense Refill     abatacept (ORENCIA, WITH MALTOSE,) 250 mg injection Infuse 1,000 mg into a venous catheter every 28 days.       ACETAMINOPHEN (TYLENOL ARTHRITIS ORAL) Take 650 mg by mouth daily as needed.       aspirin 81 MG EC tablet  "Take 40.5 mg by mouth 3 (three) times a week. 1/2 tab (40.5mg)       b complex vitamins tablet Take 1 tablet by mouth 3 (three) times a day.       BD TUBERCULIN SYRINGE 1 mL 27 x 1/2\" Syrg USE 1 SYRINGE AS DIRECTED ONCE A WEEK. 100 Syringe 0     calcium-vitamin D3-vitamin K (VIACTIV) 500-500-40 mg-unit-mcg Chew per chewable tablet Chew 1 tablet 2 (two) times a day.       cetirizine (ZYRTEC) 10 MG tablet Take 10 mg by mouth daily.       cholecalciferol, vitamin D3, (VITAMIN D3) 2,000 unit cap Take 6,000 unit marking on U-100 syringe by mouth daily.       ciprofloxacin HCl (CIPRO) 500 MG tablet Take 500 mg by mouth 2 (two) times a day.       cranberry fruit (CRANBERRY) 475 mg cap Take 2 capsules by mouth daily.       cyanocobalamin, vitamin B-12, (VITAMIN B-12) 2,500 mcg Subl Place 1 tablet under the tongue daily.       cyclobenzaprine (FLEXERIL) 10 MG tablet Take 10 mg by mouth 3 (three) times a day as needed for muscle spasms.       DENOSUMAB (PROLIA SUBQ) Inject 60 mg under the skin every 6 (six) months.       docusate sodium (STOOL SOFTENER) 100 MG capsule Take 200 mg by mouth bedtime.       DULoxetine (CYMBALTA) 60 MG capsule Take 90 mg by mouth bedtime.        EPINEPHrine (EPIPEN) 0.3 mg/0.3 mL (1:1,000) atIn Inject into the shoulder, thigh, or buttocks once.       fluticasone (FLONASE) 50 mcg/actuation nasal spray 1 spray into each nostril daily.       folic acid (FOLVITE) 1 MG tablet Take 3 tablets (3 mg total) by mouth daily. 270 tablet 3     GLUC PEDRAZA/CHONDRO PEDRAZA A/VIT C/MN (GLUCOSAMINE CHONDROITIN MAXSTR ORAL) Take 1 tablet by mouth 2 times a day at 6:00 am and 4:00 pm.       lansoprazole (PREVACID) 15 MG capsule Take 15 mg by mouth daily.       levothyroxine (SYNTHROID, LEVOTHROID) 50 MCG tablet Take 50 mcg by mouth daily.       lidocaine (XYLOCAINE) 5 % ointment Apply topically as needed.       lisinopril (PRINIVIL,ZESTRIL) 10 MG tablet TAKE ONE TABLET DAILY FOR BLOOD PRESSURE  4     magnesium oxide 250 mg " Tab Take 250 mg by mouth 2 (two) times a day.       methotrexate 25 mg/mL injection INJECT 0.6 ML (15 MG TOTAL) UNDER THE SKIN ONCE A WEEK. 8 mL 0     mirabegron (MYRBETRIQ) 25 mg Tb24 ER tablet Take 50 mg by mouth daily.        MULTIVITAMIN ORAL Take 1 tablet by mouth 2 times a day at 6:00 am and 4:00 pm. Flinstone chewables       nystatin (MYCOSTATIN) powder Apply topically 2 (two) times a day. 60 g 3     oxyCODONE (OXYCONTIN) 10 MG 12 hr tablet Take 10 mg by mouth every morning. SHAILA - Brand only, pt allergic to generic       PAPAYA ENZYME ORAL Take 4 capsules by mouth daily.       phentermine (ADIPEX-P) 37.5 mg tablet 1/2 tab every morning. May increase to full tab every morning after 1 week. 90 tablet 0     polyethylene glycol (MIRALAX) 17 gram packet Take 17 g by mouth daily as needed.       sulfamethoxazole-trimethoprim (SEPTRA DS) 800-160 mg per tablet Take 1 tablet by mouth 2 (two) times a day.       trospium (SANCTURA) 20 mg tablet Take 20 mg by mouth 2 (two) times a day.       UNABLE TO FIND Med Name: leg cramps       vitamin E 400 UNIT capsule Take 400 Units by mouth 2 (two) times a day.       Current Facility-Administered Medications   Medication Dose Route Frequency Provider Last Rate Last Dose     sodium hyaluronate 10 mg/mL(mw 2.4 -3.6 million) injection 20 mg (EUFLEXXA)  2 mL Intra-articular Weekly ANA Pathak   20 mg at 04/27/17 1019         DETAILED EXAMINATION:  There were no vitals filed for this visit. Comfortable.  Alert oriented.  Eyes are without inflammatory changes.  Examination of both upper and lower extremities is performed for swollen & tender joints, range of motion, rash, weakness, discoloration, warmth, swelling.  The skin examined for nodules. The salient normal / abnormal findings are appended.  JLT of the knees bilaterally.    LAB / IMAGING DATA:  ALT   Date Value Ref Range Status   04/14/2017 21 0 - 45 U/L Final   03/27/2017 17 0 - 45 U/L Final   02/15/2017 17 0 - 45 U/L  Final     Albumin   Date Value Ref Range Status   04/14/2017 3.5 3.5 - 5.0 g/dL Final   03/27/2017 3.3 (L) 3.5 - 5.0 g/dL Final   02/15/2017 3.4 (L) 3.5 - 5.0 g/dL Final     Creatinine   Date Value Ref Range Status   04/14/2017 0.68 0.60 - 1.10 mg/dL Final   03/27/2017 0.63 0.60 - 1.10 mg/dL Final   02/15/2017 0.64 0.60 - 1.10 mg/dL Final       WBC   Date Value Ref Range Status   04/14/2017 8.4 4.0 - 11.0 thou/uL Final   03/27/2017 7.9 4.0 - 11.0 thou/uL Final   10/02/2014 8.0 4.0 - 11.0 thou/uL Final     Hemoglobin   Date Value Ref Range Status   04/14/2017 13.0 12.0 - 16.0 g/dL Final   03/27/2017 12.7 12.0 - 16.0 g/dL Final   02/15/2017 13.0 12.0 - 16.0 g/dL Final     Platelets   Date Value Ref Range Status   04/14/2017 307 140 - 440 thou/uL Final   03/27/2017 275 140 - 440 thou/uL Final   02/15/2017 285 140 - 440 thou/uL Final       Lab Results   Component Value Date    RF 20.9 03/13/2015    SEDRATE 19 05/16/2016

## 2021-06-10 NOTE — PROGRESS NOTES
ASSESSMENT AND PLAN:  Marlin Alejo 67 y.o. female is here for second Euflexxa injection which is done in her right knee anterolateral approach 20 mg aseptically injected.  Return for third injection of Euflexxa next Thursday.  Diagnoses and all orders for this visit:    Bilateral primary osteoarthritis of knee    Chronic pain of right knee        HISTORY OF PRESENTING ILLNESS:  Marlin Alejo 67 y.o. is here for follow up of osteoarthritis.  She is here for the second injection of Euflexxa.  She has tolerated the first 1.  She feels that there is already beginning of some improvement..    Further historical information, including ROS and limitation in   activities as noted in the multidimensional health assessment questionnaire scanned in the EMR and in the assessment and plan section.    ALLERGIES:Bee sting kit; Codeine; Lemonade flavor; Neurontin [gabapentin]; Nortriptyline; Nsaids (non-steroidal anti-inflammatory drug); Oxycodone; Strawberry; Talwin [pentazocine lactate]; Ultracet [tramadol-acetaminophen]; Unable to assess; and Ampicillin    PAST MEDICAL/ACTIVE PROBLEMS/MEDICATION/SOCIAL DATA  Past Medical History:   Diagnosis Date     Arthritis of back      Fibromyalgia, primary      Hand swelling      Osteoarthritis      Osteoporosis      History   Smoking Status     Never Smoker   Smokeless Tobacco     Never Used     Patient Active Problem List   Diagnosis     Osteoporosis     Fibromyalgia     Morbid obesity     Essential hypertension     Postoperative malabsorption     Frequent UTI     Primary osteoarthritis of both hands     Lumbar spondylosis     High risk medication use     Seronegative rheumatoid arthritis of multiple sites     Chronic pain of right knee     Bilateral primary osteoarthritis of knee     Current Outpatient Prescriptions   Medication Sig Dispense Refill     abatacept (ORENCIA, WITH MALTOSE,) 250 mg injection Infuse 1,000 mg into a venous catheter every 28 days.       ACETAMINOPHEN (TYLENOL  "ARTHRITIS ORAL) Take 650 mg by mouth daily as needed.       aspirin 81 MG EC tablet Take 40.5 mg by mouth 3 (three) times a week. 1/2 tab (40.5mg)       b complex vitamins tablet Take 1 tablet by mouth 3 (three) times a day.       BD TUBERCULIN SYRINGE 1 mL 27 x 1/2\" Syrg USE 1 SYRINGE AS DIRECTED ONCE A WEEK. 100 Syringe 0     calcium-vitamin D3-vitamin K (VIACTIV) 500-500-40 mg-unit-mcg Chew per chewable tablet Chew 1 tablet 2 (two) times a day.       cetirizine (ZYRTEC) 10 MG tablet Take 10 mg by mouth daily.       cholecalciferol, vitamin D3, (VITAMIN D3) 2,000 unit cap Take 6,000 unit marking on U-100 syringe by mouth daily.       ciprofloxacin HCl (CIPRO) 500 MG tablet Take 500 mg by mouth 2 (two) times a day.       cranberry fruit (CRANBERRY) 475 mg cap Take 2 capsules by mouth daily.       cyanocobalamin, vitamin B-12, (VITAMIN B-12) 2,500 mcg Subl Place 1 tablet under the tongue daily.       cyclobenzaprine (FLEXERIL) 10 MG tablet Take 10 mg by mouth 3 (three) times a day as needed for muscle spasms.       DENOSUMAB (PROLIA SUBQ) Inject 60 mg under the skin every 6 (six) months.       docusate sodium (STOOL SOFTENER) 100 MG capsule Take 200 mg by mouth bedtime.       DULoxetine (CYMBALTA) 60 MG capsule Take 90 mg by mouth bedtime.        EPINEPHrine (EPIPEN) 0.3 mg/0.3 mL (1:1,000) atIn Inject into the shoulder, thigh, or buttocks once.       fluticasone (FLONASE) 50 mcg/actuation nasal spray 1 spray into each nostril daily.       GLUC PEDRAZA/CHONDRO PEDRAZA A/VIT C/MN (GLUCOSAMINE CHONDROITIN MAXSTR ORAL) Take 1 tablet by mouth 2 times a day at 6:00 am and 4:00 pm.       lansoprazole (PREVACID) 15 MG capsule Take 15 mg by mouth daily.       levothyroxine (SYNTHROID, LEVOTHROID) 50 MCG tablet Take 50 mcg by mouth daily.       lidocaine (XYLOCAINE) 5 % ointment Apply topically as needed.       lisinopril (PRINIVIL,ZESTRIL) 10 MG tablet TAKE ONE TABLET DAILY FOR BLOOD PRESSURE  4     magnesium oxide 250 mg Tab Take " "250 mg by mouth 2 (two) times a day.       methotrexate 25 mg/mL injection INJECT 0.6 ML (15 MG TOTAL) UNDER THE SKIN ONCE A WEEK. 8 mL 0     mirabegron (MYRBETRIQ) 25 mg Tb24 ER tablet Take 50 mg by mouth daily.        MULTIVITAMIN ORAL Take 1 tablet by mouth 2 times a day at 6:00 am and 4:00 pm. Flinstone chewables       nystatin (MYCOSTATIN) powder Apply topically 2 (two) times a day. 60 g 3     oxyCODONE (OXYCONTIN) 10 MG 12 hr tablet Take 10 mg by mouth every morning. SHAILA - Brand only, pt allergic to generic       PAPAYA ENZYME ORAL Take 4 capsules by mouth daily.       phentermine (ADIPEX-P) 37.5 mg tablet 1/2 tab every morning. May increase to full tab every morning after 1 week. 90 tablet 0     polyethylene glycol (MIRALAX) 17 gram packet Take 17 g by mouth daily as needed.       sulfamethoxazole-trimethoprim (SEPTRA DS) 800-160 mg per tablet Take 1 tablet by mouth 2 (two) times a day.       trospium (SANCTURA) 20 mg tablet Take 20 mg by mouth 2 (two) times a day.       UNABLE TO FIND Med Name: leg cramps       vitamin E 400 UNIT capsule Take 400 Units by mouth 2 (two) times a day.       folic acid (FOLVITE) 1 MG tablet Take 3 tablets (3 mg total) by mouth daily. 270 tablet 3     Current Facility-Administered Medications   Medication Dose Route Frequency Provider Last Rate Last Dose     sodium hyaluronate 10 mg/mL(mw 2.4 -3.6 million) injection 20 mg (EUFLEXXA)  2 mL Intra-articular Weekly ANA Pathak   20 mg at 04/27/17 1019         DETAILED EXAMINATION:  Vitals:    04/27/17 1000   BP: 128/84   Patient Site: Right Arm   Patient Position: Sitting   Cuff Size: Adult Regular   Pulse: 84   Weight: (!) 248 lb (112.5 kg)   Height: 5' 1.5\" (1.562 m)    Comfortable.  Alert oriented.  Eyes are without inflammatory changes.  Examination of both upper and lower extremities is performed for swollen & tender joints, range of motion, rash, weakness, discoloration, warmth, swelling.  The skin examined for nodules. " The salient normal / abnormal findings are appended.  JLT of the knees bilaterally.    LAB / IMAGING DATA:  ALT   Date Value Ref Range Status   04/14/2017 21 0 - 45 U/L Final   03/27/2017 17 0 - 45 U/L Final   02/15/2017 17 0 - 45 U/L Final     Albumin   Date Value Ref Range Status   04/14/2017 3.5 3.5 - 5.0 g/dL Final   03/27/2017 3.3 (L) 3.5 - 5.0 g/dL Final   02/15/2017 3.4 (L) 3.5 - 5.0 g/dL Final     Creatinine   Date Value Ref Range Status   04/14/2017 0.68 0.60 - 1.10 mg/dL Final   03/27/2017 0.63 0.60 - 1.10 mg/dL Final   02/15/2017 0.64 0.60 - 1.10 mg/dL Final       WBC   Date Value Ref Range Status   04/14/2017 8.4 4.0 - 11.0 thou/uL Final   03/27/2017 7.9 4.0 - 11.0 thou/uL Final   10/02/2014 8.0 4.0 - 11.0 thou/uL Final     Hemoglobin   Date Value Ref Range Status   04/14/2017 13.0 12.0 - 16.0 g/dL Final   03/27/2017 12.7 12.0 - 16.0 g/dL Final   02/15/2017 13.0 12.0 - 16.0 g/dL Final     Platelets   Date Value Ref Range Status   04/14/2017 307 140 - 440 thou/uL Final   03/27/2017 275 140 - 440 thou/uL Final   02/15/2017 285 140 - 440 thou/uL Final       Lab Results   Component Value Date    RF 20.9 03/13/2015    SEDRATE 19 05/16/2016

## 2021-06-10 NOTE — PROGRESS NOTES
ASSESSMENT AND PLAN:  Marlin Alejo 67 y.o. female has a combination of seronegative rheumatoid arthritis, osteoarthritis.  The earlier is nicely controlled with methotrexate and Orencia infusions at [facility].  Her main concern today is pain around the knees and swelling on the ankles.  She has edema which was demonstrated to her.  She is going to go back to her primary physician for that.  With regards to the knee pain she has evidence of osteoarthritis, today I have shown her her knee x-rays from the recent past.  Previously the corticosteroid injections were good only for a week.  She wants to see how the Visco supplementation might work for her.  Today Euflexxa 20 mg injected into the right knee anterolateral approach.  She will return for 2 more injections.  Should this not work for her she is aware that the next option would be consideration for arthroplasty.      Diagnoses and all orders for this visit:    Seronegative rheumatoid arthritis of multiple sites    Primary osteoarthritis of both hands    High risk medication use    Chronic pain of right knee  -     sodium hyaluronate 10 mg/mL(mw 2.4 -3.6 million) injection 20 mg (EUFLEXXA); Inject 2 mL (20 mg total) into the joint once a week.    Bilateral primary osteoarthritis of knee  -     sodium hyaluronate 10 mg/mL(mw 2.4 -3.6 million) injection 20 mg (EUFLEXXA); Inject 2 mL (20 mg total) into the joint once a week.      HISTORY OF PRESENTING ILLNESS:  Marlin Alejo, 67 y.o., female is here for rheumatoid arthritis,  seronegative, widespread osteoarthritis/degenerative changes including at the knees especially the right side, lumbar spondylosis.  Her worst pain is in her knees.  This is worse on the right side.  This is worse with activity.  Sometimes she feels as if it is going to give way.  She did have a history of meniscus repair several years ago.  She points to the medial aspect of the right knee for the worst pain which she  rated at a mild to moderately severe without radiation.  This also troubles her at night.  She rates this as 6.0/10.  He reports that this is gone on since 1990 when she started hurting and almost all her joints predominantly in the lower extremities but also over time the hands.  She established care at Dr. Bojorquez experience she was started on Humira and then change to her current Orencia.  Originally when she was started on Orencia she recalls that they used to be a more dramatic, and durable benefit.  Of late during the last 1 week some of her symptoms seem to come back.  She reports that with the initiation of methotrexate her hands did improve.  However she continues to hurt such as in her pinky and digit right hand at the DIP area, and the lumbar spine.  She has had disc disease there as well as spinal stenosis and did have 2 surgeries.  We talked about how lumbar spine is unlikely that contributed to by the rheumatoid arthritis.  Similarly the DIP of the right fifth digit would be an unlikely source of her pain from rheumatoid arthritis and more osteoarthritis.  She rated this as moderately severe.  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  Her  is 66.  She has taken acetaminophen.  She takes OxyContin for joint pains once in the morning.  She is nonsmoker.  Tries to exercise on a daily basis, she had a gastric bypass, Keesha-en-Y, and did lose 120 pounds.  Therefore she has difficulty taking some of the medications as as nonsteroidals.  She noted itching of the eyes, ringing in the ears, dryness of mouth, hypertension, cough, history of diarrhea/constipation..   Further historical information and ADL limitations as noted in the multidimensional health assessment questionnaire attached in the EMR. There is no definite history  "of rheumatoid arthritis in the family however her mother and grandmother did have arthritis of unknown type.    ALLERGIES:Bee sting kit; Codeine; Lemonade flavor; Neurontin [gabapentin]; Nortriptyline; Nsaids (non-steroidal anti-inflammatory drug); Oxycodone; Strawberry; Talwin [pentazocine lactate]; Ultracet [tramadol-acetaminophen]; Unable to assess; and Ampicillin    PAST MEDICAL/ACTIVE PROBLEMS/MEDICATION/ FAMILY HISTORY/SOCIAL DATA:  The patient has a family history of  Past Medical History:   Diagnosis Date     Arthritis of back      Fibromyalgia, primary      Hand swelling      Osteoarthritis      Osteoporosis      History   Smoking Status     Never Smoker   Smokeless Tobacco     Never Used     Patient Active Problem List   Diagnosis     Osteoporosis     Fibromyalgia     Morbid obesity     Essential hypertension     Postoperative malabsorption     Frequent UTI     Primary osteoarthritis of both hands     Lumbar spondylosis     High risk medication use     Seronegative rheumatoid arthritis of multiple sites     Chronic pain of right knee     Bilateral primary osteoarthritis of knee     Current Outpatient Prescriptions   Medication Sig Dispense Refill     abatacept (ORENCIA, WITH MALTOSE,) 250 mg injection Infuse 1,000 mg into a venous catheter every 28 days.       ACETAMINOPHEN (TYLENOL ARTHRITIS ORAL) Take 650 mg by mouth daily as needed.       aspirin 81 MG EC tablet Take 40.5 mg by mouth 3 (three) times a week. 1/2 tab (40.5mg)       b complex vitamins tablet Take 1 tablet by mouth 3 (three) times a day.       BD TUBERCULIN SYRINGE 1 mL 27 x 1/2\" Syrg USE 1 SYRINGE AS DIRECTED ONCE A WEEK. 100 Syringe 0     calcium-vitamin D3-vitamin K (VIACTIV) 500-500-40 mg-unit-mcg Chew per chewable tablet Chew 1 tablet 2 (two) times a day.       cetirizine (ZYRTEC) 10 MG tablet Take 10 mg by mouth daily.       cholecalciferol, vitamin D3, (VITAMIN D3) 2,000 unit cap Take 6,000 unit marking on U-100 syringe by mouth " daily.       ciprofloxacin HCl (CIPRO) 500 MG tablet Take 500 mg by mouth 2 (two) times a day.       cranberry fruit (CRANBERRY) 475 mg cap Take 2 capsules by mouth daily.       cyanocobalamin, vitamin B-12, (VITAMIN B-12) 2,500 mcg Subl Place 1 tablet under the tongue daily.       cyclobenzaprine (FLEXERIL) 10 MG tablet Take 10 mg by mouth 3 (three) times a day as needed for muscle spasms.       DENOSUMAB (PROLIA SUBQ) Inject 60 mg under the skin every 6 (six) months.       docusate sodium (STOOL SOFTENER) 100 MG capsule Take 200 mg by mouth bedtime.       DULoxetine (CYMBALTA) 60 MG capsule Take 90 mg by mouth bedtime.        EPINEPHrine (EPIPEN) 0.3 mg/0.3 mL (1:1,000) atIn Inject into the shoulder, thigh, or buttocks once.       fluticasone (FLONASE) 50 mcg/actuation nasal spray 1 spray into each nostril daily.       GLUC PEDRAZA/CHONDRO PEDRAZA A/VIT C/MN (GLUCOSAMINE CHONDROITIN MAXSTR ORAL) Take 1 tablet by mouth 2 times a day at 6:00 am and 4:00 pm.       lansoprazole (PREVACID) 15 MG capsule Take 15 mg by mouth daily.       levothyroxine (SYNTHROID, LEVOTHROID) 50 MCG tablet Take 50 mcg by mouth daily.       lidocaine (XYLOCAINE) 5 % ointment Apply topically as needed.       lisinopril (PRINIVIL,ZESTRIL) 10 MG tablet TAKE ONE TABLET DAILY FOR BLOOD PRESSURE  4     magnesium oxide 250 mg Tab Take 250 mg by mouth 2 (two) times a day.       methotrexate 25 mg/mL injection INJECT 0.6 ML (15 MG TOTAL) UNDER THE SKIN ONCE A WEEK. 8 mL 0     mirabegron (MYRBETRIQ) 25 mg Tb24 ER tablet Take 50 mg by mouth daily.        MULTIVITAMIN ORAL Take 1 tablet by mouth 2 times a day at 6:00 am and 4:00 pm. Flinstone chewables       nystatin (MYCOSTATIN) powder Apply topically 2 (two) times a day. 60 g 3     oxyCODONE (OXYCONTIN) 10 MG 12 hr tablet Take 10 mg by mouth every morning. SHAILA - Brand only, pt allergic to generic       PAPAYA ENZYME ORAL Take 4 capsules by mouth daily.       phentermine (ADIPEX-P) 37.5 mg tablet 1/2 tab every  "morning. May increase to full tab every morning after 1 week. 90 tablet 0     polyethylene glycol (MIRALAX) 17 gram packet Take 17 g by mouth daily as needed.       sulfamethoxazole-trimethoprim (SEPTRA DS) 800-160 mg per tablet Take 1 tablet by mouth 2 (two) times a day.       trospium (SANCTURA) 20 mg tablet Take 20 mg by mouth 2 (two) times a day.       UNABLE TO FIND Med Name: leg cramps       vitamin E 400 UNIT capsule Take 400 Units by mouth 2 (two) times a day.       folic acid (FOLVITE) 1 MG tablet Take 3 tablets (3 mg total) by mouth daily. 270 tablet 3     No current facility-administered medications for this visit.      DETAILED EXAMINATION  04/20/17  :  Vitals:    04/20/17 1313   BP: 140/84   Pulse: (!) 108   SpO2: 95%   Weight: (!) 248 lb (112.5 kg)   Height: 5' 1.5\" (1.562 m)     Alert oriented. Head including the face is examined for malar rash, heliotropes, scarring, lupus pernio. Eyes examined for redness such as in episcleritis/scleritis, periorbital lesions.   Neck examined  for lymph nodes, range of motion Both upper and lower extremities (all four) examined for swollen, warm &/or  tender joints, range of motion, rash, muscle weakness, edema. The salient normal / abnormal findings are appended.  She has widespread tenderness in her DIPs and PIPs, No appreciable synovitis in any of the palpable appendicular joints.   She has JLT of the knees, particularly on the right.  She uses a walker for ambulation.   .         LAB / IMAGING DATA:  ALT   Date Value Ref Range Status   04/14/2017 21 0 - 45 U/L Final   03/27/2017 17 0 - 45 U/L Final   02/15/2017 17 0 - 45 U/L Final     Albumin   Date Value Ref Range Status   04/14/2017 3.5 3.5 - 5.0 g/dL Final   03/27/2017 3.3 (L) 3.5 - 5.0 g/dL Final   02/15/2017 3.4 (L) 3.5 - 5.0 g/dL Final     Creatinine   Date Value Ref Range Status   04/14/2017 0.68 0.60 - 1.10 mg/dL Final   03/27/2017 0.63 0.60 - 1.10 mg/dL Final   02/15/2017 0.64 0.60 - 1.10 mg/dL Final "       WBC   Date Value Ref Range Status   04/14/2017 8.4 4.0 - 11.0 thou/uL Final   03/27/2017 7.9 4.0 - 11.0 thou/uL Final   10/02/2014 8.0 4.0 - 11.0 thou/uL Final     Hemoglobin   Date Value Ref Range Status   04/14/2017 13.0 12.0 - 16.0 g/dL Final   03/27/2017 12.7 12.0 - 16.0 g/dL Final   02/15/2017 13.0 12.0 - 16.0 g/dL Final     Platelets   Date Value Ref Range Status   04/14/2017 307 140 - 440 thou/uL Final   03/27/2017 275 140 - 440 thou/uL Final   02/15/2017 285 140 - 440 thou/uL Final       Lab Results   Component Value Date    RF 20.9 03/13/2015    SEDRATE 19 05/16/2016

## 2021-06-11 NOTE — TELEPHONE ENCOUNTER
Last ov-3/4/20   Last labs-8/6/20    Future appt-11/23/20    Please advise refill, outside RN refill protocol range- past due for f/u visit

## 2021-06-11 NOTE — TELEPHONE ENCOUNTER
Please discuss with patient:     If no new symptoms since last visit with us (particularly no active infection, no dry cough/shortness of breath), then okay to refill methotrexate at current dose.  If develops such symptoms, should come in sooner.    In the future, patient needs to follow-up no later than every 4 months for reassessment prior to additional refills.    Recommend having standing order monitoring labs checked in the first week of November 2020.

## 2021-06-11 NOTE — PROGRESS NOTES
ASSESSMENT AND PLAN:  Marlin Alejo 67 y.o. female is here for follow-up of her seronegative rheumatoid arthritis and osteoarthritis.  With regards to rheumatoid arthritis she is doing great with a combination of q 30 days Orencia and every 7 days a methotrexate injections.  She has tolerated this well.  Her mouth ulcers respond nicely to folic acid, although she still gets intermittent ulceration yet.  Her recent labs for methotrexate toxicity are within acceptable range.  Unfortunately she has been hurting more in her right knee.  She had about couple of months of relief with Euflexxa.  Steroid injections are no good for her.  She is not all that interested in surgical options.  She is on narcotics for this.  I have outlined to her that we will have to wait till November before a repeat Euflexxa can be done.  Other options were discussed with her and she is going to decide which one she wants to exercise.  I have asked him to return for follow-up here in 4 months with labs every 2 months.      Diagnoses and all orders for this visit:    Seronegative rheumatoid arthritis of multiple sites  -     methotrexate 25 mg/mL injection; INJECT 0.6 ML (15 MG TOTAL) UNDER THE SKIN ONCE A WEEK.  Dispense: 8 mL; Refill: 0  -     folic acid (FOLVITE) 1 MG tablet; Take 3 tablets (3 mg total) by mouth daily.  Dispense: 270 tablet; Refill: 3    Chronic pain of right knee    Bilateral primary osteoarthritis of knee    High risk medication use  -     folic acid (FOLVITE) 1 MG tablet; Take 3 tablets (3 mg total) by mouth daily.  Dispense: 270 tablet; Refill: 3      HISTORY OF PRESENTING ILLNESS:  Marlin Alejo, 67 y.o., female is here for rheumatoid arthritis which is  seronegative, widespread osteoarthritis/degenerative changes including at the knees especially the right side, lumbar spondylosis.  She noted that her knee is been troubling her again.  This is on the right side.  There was about a 2 month reprieve with Euflexxa.   Prior to that steroids have not helped.  This is worse with activity.  This has not been associated with nocturnal symptoms.  She also noted discomfort in her shoulders across the trapezius.    She noted the pain level to be severe.  Interfering with several day-to-day activities.  She noted her morning stiffness is 30 minutes.  She has noted no fever weight loss she has blurry vision, she has no eye redness she still gets mouth ulcers intermittently.   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  Her  is in good health.  There is no definite history of rheumatoid arthritis in the family however her mother and grandmother did have arthritis of unknown type.  .  She has taken acetaminophen.  She takes OxyContin for joint pains once in the morning.  She is nonsmoker.  Tries to exercise on a daily basis, she had a gastric bypass, Keesha-en-Y, and did lose 120 pounds.  Therefore she has difficulty taking some of the medications as as nonsteroidals. .   Further historical information and ADL limitations as noted in the multidimensional health assessment questionnaire attached in the EMR.  In the past she has tried various medications for her arthralgias besides the rheumatoid arthritis management and finds them hard to tolerate as noted in the listed below.  ALLERGIES:Bee sting kit; Codeine; Lemonade flavor; Neurontin [gabapentin]; Nortriptyline; Nsaids (non-steroidal anti-inflammatory drug); Oxycodone; Strawberry; Talwin [pentazocine lactate]; Ultracet [tramadol-acetaminophen]; Unable to assess; and Ampicillin    PAST MEDICAL/ACTIVE PROBLEMS/MEDICATION/ FAMILY HISTORY/SOCIAL DATA:  The patient has a family history of  Past Medical History:   Diagnosis Date     Arthritis of back      Fibromyalgia, primary      Hand swelling      Osteoarthritis      Osteoporosis      History   Smoking Status     Never Smoker   Smokeless Tobacco     Never Used     Patient Active Problem List   Diagnosis     Osteoporosis     Fibromyalgia      "Morbid obesity     Essential hypertension     Postoperative malabsorption     Frequent UTI     Primary osteoarthritis of both hands     Lumbar spondylosis     High risk medication use     Seronegative rheumatoid arthritis of multiple sites     Chronic pain of right knee     Bilateral primary osteoarthritis of knee     Current Outpatient Prescriptions   Medication Sig Dispense Refill     abatacept (ORENCIA, WITH MALTOSE,) 250 mg injection Infuse 1,000 mg into a venous catheter every 28 days.       ACETAMINOPHEN (TYLENOL ARTHRITIS ORAL) Take 650 mg by mouth daily as needed.       aspirin 81 MG EC tablet Take 40.5 mg by mouth 3 (three) times a week. 1/2 tab (40.5mg)       b complex vitamins tablet Take 1 tablet by mouth 3 (three) times a day.       BD TUBERCULIN SYRINGE 1 mL 27 x 1/2\" Syrg USE 1 SYRINGE AS DIRECTED ONCE A WEEK. 100 Syringe 0     calcium-vitamin D3-vitamin K (VIACTIV) 500-500-40 mg-unit-mcg Chew per chewable tablet Chew 1 tablet 2 (two) times a day.       cetirizine (ZYRTEC) 10 MG tablet Take 10 mg by mouth daily.       cholecalciferol, vitamin D3, (VITAMIN D3) 2,000 unit cap Take 6,000 unit marking on U-100 syringe by mouth daily.       cranberry fruit (CRANBERRY) 475 mg cap Take 2 capsules by mouth daily.       cyanocobalamin, vitamin B-12, (VITAMIN B-12) 2,500 mcg Subl Place 1 tablet under the tongue daily.       cyclobenzaprine (FLEXERIL) 10 MG tablet Take 10 mg by mouth 3 (three) times a day as needed for muscle spasms.       DENOSUMAB (PROLIA SUBQ) Inject 60 mg under the skin every 6 (six) months.       docusate sodium (STOOL SOFTENER) 100 MG capsule Take 200 mg by mouth bedtime.       DULoxetine (CYMBALTA) 60 MG capsule Take 90 mg by mouth bedtime.        EPINEPHrine (EPIPEN) 0.3 mg/0.3 mL (1:1,000) atIn Inject into the shoulder, thigh, or buttocks once.       fluticasone (FLONASE) 50 mcg/actuation nasal spray 1 spray into each nostril daily.       GLUC PEDRAZA/CHONDRO PEDRAZA A/VIT C/MN (GLUCOSAMINE " CHONDROITIN MAXSTR ORAL) Take 1 tablet by mouth 2 times a day at 6:00 am and 4:00 pm.       lansoprazole (PREVACID) 15 MG capsule Take 15 mg by mouth daily.       levothyroxine (SYNTHROID, LEVOTHROID) 50 MCG tablet Take 50 mcg by mouth daily.       lidocaine (XYLOCAINE) 5 % ointment Apply topically as needed.       lisinopril (PRINIVIL,ZESTRIL) 10 MG tablet TAKE ONE TABLET DAILY FOR BLOOD PRESSURE  4     magnesium oxide 250 mg Tab Take 250 mg by mouth 2 (two) times a day.       methotrexate 25 mg/mL injection INJECT 0.6 ML (15 MG TOTAL) UNDER THE SKIN ONCE A WEEK. 8 mL 0     mirabegron (MYRBETRIQ) 25 mg Tb24 ER tablet Take 50 mg by mouth daily.        MULTIVITAMIN ORAL Take 1 tablet by mouth 2 times a day at 6:00 am and 4:00 pm. Flinstone chewables       nystatin (MYCOSTATIN) powder Apply topically 2 (two) times a day. 60 g 3     oxyCODONE (OXYCONTIN) 10 MG 12 hr tablet Take 10 mg by mouth every morning. SHAILA - Brand only, pt allergic to generic       PAPAYA ENZYME ORAL Take 4 capsules by mouth daily.       phentermine (ADIPEX-P) 37.5 mg tablet 1/2 tab every morning. May increase to full tab every morning after 1 week. 90 tablet 0     polyethylene glycol (MIRALAX) 17 gram packet Take 17 g by mouth daily as needed.       sulfamethoxazole-trimethoprim (SEPTRA DS) 800-160 mg per tablet Take 1 tablet by mouth 2 (two) times a day.       trospium (SANCTURA) 20 mg tablet Take 20 mg by mouth 2 (two) times a day.       UNABLE TO FIND Med Name: leg cramps       vitamin E 400 UNIT capsule Take 400 Units by mouth 2 (two) times a day.       folic acid (FOLVITE) 1 MG tablet Take 3 tablets (3 mg total) by mouth daily. 270 tablet 3     No current facility-administered medications for this visit.      DETAILED EXAMINATION  07/06/17  :  Vitals:    07/06/17 1020   Pulse: 88   Resp: 18   Weight: (!) 245 lb (111.1 kg)     Alert oriented. Head including the face is examined for malar rash, heliotropes, scarring, lupus pernio. Eyes  examined for redness such as in episcleritis/scleritis, periorbital lesions.   Neck examined  for lymph nodes, range of motion Both upper and lower extremities (all four) examined for swollen, warm &/or  tender joints, range of motion, rash, muscle weakness, edema. The salient normal / abnormal findings are appended.  Marked JLT of the right knee.  She uses a walker for ambulation.  She has minimal impingement of the right shoulder.  She does not have synovitis in any of the palpable appendicular joints.  .    LAB / IMAGING DATA:  ALT   Date Value Ref Range Status   06/28/2017 20 0 - 45 U/L Final   04/14/2017 21 0 - 45 U/L Final   03/27/2017 17 0 - 45 U/L Final     Albumin   Date Value Ref Range Status   06/28/2017 3.2 (L) 3.5 - 5.0 g/dL Final   04/14/2017 3.5 3.5 - 5.0 g/dL Final   03/27/2017 3.3 (L) 3.5 - 5.0 g/dL Final     Creatinine   Date Value Ref Range Status   06/28/2017 0.62 0.60 - 1.10 mg/dL Final   04/14/2017 0.68 0.60 - 1.10 mg/dL Final   03/27/2017 0.63 0.60 - 1.10 mg/dL Final       WBC   Date Value Ref Range Status   06/28/2017 7.2 4.0 - 11.0 thou/uL Final   04/14/2017 8.4 4.0 - 11.0 thou/uL Final   10/02/2014 8.0 4.0 - 11.0 thou/uL Final     Hemoglobin   Date Value Ref Range Status   06/28/2017 12.5 12.0 - 16.0 g/dL Final   04/14/2017 13.0 12.0 - 16.0 g/dL Final   03/27/2017 12.7 12.0 - 16.0 g/dL Final     Platelets   Date Value Ref Range Status   06/28/2017 295 140 - 440 thou/uL Final   04/14/2017 307 140 - 440 thou/uL Final   03/27/2017 275 140 - 440 thou/uL Final       Lab Results   Component Value Date    RF 20.9 03/13/2015    SEDRATE 19 05/16/2016

## 2021-06-11 NOTE — TELEPHONE ENCOUNTER
Refilled per Rheum RN refill protocol    Pt states she has been feeling fine, no new symptoms, no recent infections or cough/SOB. Pt gets her labs drawn at infusion appts, pt will have next labs drawn at November infusion appt.

## 2021-06-12 NOTE — PROGRESS NOTES
Pt. Present to INF clnic today with use of motorized w/c.  Pt. was accompanied by her . Peripheral IV started in left arm without diffuculty. Pt. received an infusion of Orencia without any problems. IV flushed and IV discontinued. Coban dressing appllied and all questions answered.Merle Varela

## 2021-06-13 NOTE — PROGRESS NOTES
[facility] Certification Request    October 6, 2017      Patient: Marlin Alejo  MR Number: [id number]  YOB: 1950  Date of Visit: 10/6/2017      Dear Gwendolyn Mcrae,    Thank you for this referral.   We are seeing Marlin Alejo for Physical Therapy of low back pain, balance problems and physical deconditioning .    Medicare and/or Medicaid requires physician review and approval of the treatment plan. Please review the plan of care and verify that you agree with the therapy plan of care by co-signing this note.      Plan of Care  Authorization / Certification Start Date: 10/06/17  Authorization / Certification End Date: 01/05/18  Authorization / Certification Number of Visits: 12  Communication with: Referral Source  Patient Related Instruction: Nature of Condition;Treatment plan and rationale;Self Care instruction;Basis of treatment;Body mechanics;Posture;Next steps;Expected outcome  Times per Week: 1-2  Number of Weeks: 6  Number of Visits: 12  Therapeutic Exercise: ROM;Stretching;Strengthening  Neuromuscular Reeducation: kinesio tape;posture;balance/proprioception;TNE;core  Manual Therapy: soft tissue mobilization;myofascial release;joint mobilization;muscle energy  Modalities: TENS;ultrasound  Gait Training: as indicated    Goals:  Pt. will be independent with home exercise program in : 4 weeks  Pt. will have improved quality of sleep: with less pain;waking less times/night;in 4 weeks  Pt. will show improved balance for safer : ambulation;standing;for dressing/grooming;for household ambulation;for community ambulation;with minimal assistance;in 6 weeks  Pt. will improve posture : and demonstrate posture with minimal to no cuing;in sitting;in standing;in walking;in 6 weeks  Pt. will be able to walk : 10 minutes;on even surfaces;with no pain;with less difficulty;for household mobility;for community mobility;in 6 weeks  Pt. will bend: to dress;with less pain;with less difficulty;for self  care;in 6 weeks  Patient will decrease : MATTIE score;for improved quality of function;for improved quality of life;in 6 weeks      If you have any questions or concerns, please don't hesitate to call.    Sincerely,      Iza Barrios, PT        Physician recommendation:     ___ Follow therapist's recommendation        ___ Modify therapy      *Physician co-signature indicates they certify the need for these services furnished within this plan and while under their care.        [facility]   [facility] Initial Evaluation    Patient Name: Marlin Alejo  Date of evaluation: 10/6/2017  Referral Diagnosis: Chronic low back pain  Referring provider: Gwendolyn Mcrae PA-C  Visit Diagnosis:     ICD-10-CM    1. Lumbar spine pain M54.5    2. Generalized muscle weakness M62.81    3. Abnormal posture R29.3    4. Impaired functional mobility, balance, gait, and endurance Z74.09        Assessment:      Patient is a 67 y.o. female that presents with signs and symptoms consistent with low back pain, deconditioning and impaired gait secondary to lumbar fusion, poor posture and decreased core stability. Patient demonstrates impairments including decreased general strength, core stability, range of motion, flexibility and postural awareness, leading to impaired functional mobility. Patient's functional limitations include normal gait, difficulty and pain with transfers and stair ambulation, and decreased fatigue with ambulation. Patient is also limited due to increased R knee pain.  Patient educated on and demonstrated understanding of nature of impairment, plan of care, patient role and HEP. Patient compliant with PT and prognosis is good. Patient would benefit from skilled PT to progress and improve above impairments.    The POC is dynamic and will be modified on an ongoing basis.  Patient will return to clinic if symptoms persist.  Barriers to achieving goals as noted in the assessment section may affect  outcome.  Prognosis to achieve goals is  fair   Pt. is appropriate for skilled PT intervention as outlined in the Plan of Care (POC).  Pt. is a good candidate for skilled PT services to improve pain levels and function.    Goals:  Pt. will be independent with home exercise program in : 4 weeks  Pt. will have improved quality of sleep: with less pain;waking less times/night;in 4 weeks  Pt. will show improved balance for safer : ambulation;standing;for dressing/grooming;for household ambulation;for community ambulation;with minimal assistance;in 6 weeks  Pt. will improve posture : and demonstrate posture with minimal to no cuing;in sitting;in standing;in walking;in 6 weeks  Pt. will be able to walk : 10 minutes;on even surfaces;with no pain;with less difficulty;for household mobility;for community mobility;in 6 weeks  Pt. will bend: to dress;with less pain;with less difficulty;for self care;in 6 weeks  Patient will decrease : MATTIE score;for improved quality of function;for improved quality of life;in 6 weeks    Patient's expectations/goals are realistic.    Barriers to Learning or Achieving Goals:  Chronicity of the problem.  Co-morbidities or other medical factors.  back fusion, obesity, decreased endurance       Plan / Patient Instructions:        Plan of Care:   Authorization / Certification Start Date: 10/06/17  Authorization / Certification End Date: 01/05/18  Authorization / Certification Number of Visits: 12  Communication with: Referral Source  Patient Related Instruction: Nature of Condition;Treatment plan and rationale;Self Care instruction;Basis of treatment;Body mechanics;Posture;Next steps;Expected outcome  Times per Week: 1-2  Number of Weeks: 6  Number of Visits: 12  Therapeutic Exercise: ROM;Stretching;Strengthening  Neuromuscular Reeducation: kinesio tape;posture;balance/proprioception;TNE;core  Manual Therapy: soft tissue mobilization;myofascial release;joint mobilization;muscle energy  Modalities:  TENS;ultrasound  Gait Training: as indicated    POC and pathology of condition were reviewed with patient.  Pt. is in agreement with the Plan of Care  A Home Exercise Program (HEP) was initiated today.  Pt. was instructed in exercises by PT and patient was given a handout with detailed instructions.    Plan for next visit: supine bridge with hip adduction, step ups, crabwalks, gait training, sit to stands, R knee exercises, balance exercises      Subjective:       History of Present Illness:    Marlin is a 67 y.o. female who presents to therapy today with complaints of low back pain, she has been through many therapies (sister john prior to and after back fusion in , and pool therapy). Symptoms are constant and not improving. Patient reports she has a big problem with her R knee. She reports  a constant  history of similar symptoms. Patient ambulates with rolling walker and wears a back brace only when she goes out but not at home, she doesn't use her walker at home. She has fallen a few times tripping over the sidewalk. Patient's goal would to be able to walk a little bit more and without pain. She sometimes gets tingling in her feet and hands. Patient has a medical bike that she uses for 15 minutes, and a pilates chair, and she walks [place] the mall.    Pain Ratin  Pain rating at best: 5  Pain rating at worst: 10  Pain description: aching, dull and sharp    Functional limitations are described as occurring with:   ascending and descending stairs or curbs - 13 steps at home  balance  Bending - sharp pains in back  performing routine daily activities  Sleeping - 2-3x, sleeps on her side until it bother declan  standing for any length of time - poor posture  transitional movements getting in  bed, chair and car, getting out of  bed, chair and car and sit to stand  walking for about 2 hours - poor posture with lumbar flexion, her knee really hurts on the R    Patient reports benefit from:  rest  , heat          Objective:      Note: Items left blank indicates the item was not performed or not indicated at the time of the evaluation.    Patient Outcome Measures :    Modified Oswestry Low Back Pain Disablity Questionnaire  in %: 62   Scores range from 0-100%, where a score of 0% represents minimal pain and maximal function. The minimal clinically important difference is a score reduction of 12%.  Lower Extremity Functional Scale (_/80): 21   APTA score: 4 30 second sit to stand       Examination  1. Lumbar spine pain     2. Generalized muscle weakness     3. Abnormal posture     4. Impaired functional mobility, balance, gait, and endurance       Involved side: Right and Bilateral  Posture Observation:      General sitting posture is  fair.  General standing posture is poor.    Lumbar ROM:  Limited with increased pain    Lower Extremity Strength:   WFL and no increase of pain    Passive Mobility - Joint Integrity:  limited testing due to sidelying position - but assuming hypomobile 2/2 lumbar fusion    Palpation: TTP of R>L lumbar paraspinals and QL around L2-5  Flexibility: decreased of hamstrings, hip flexors, quad and ITB      Treatment Today       Patient Education: Patient educated on plan of care, prognosis, PT/patient role and HEP. Patient educated on impairments related to condition and reproduction of symptoms. Patient instructed to focus on the small goals and this may be a long process to recovery, and that exercises at home are just as important as coming to therapy. Patient was educated on importance of activity modification and taking seated rest breaks as needed. Patient demonstrated and verbalized understanding.     Manual Therapy:  None performed today    Exercises:  Exercise #1: seated hip abduction with resistance band - 10 reps  Comment #1: seated hip adduction - 10 reps   Exercise #2: sit to stand transfers - 5-10 reps  Comment #2: step ups - 10-20 reps   Exercise #3: standing hip abduction and  extension - 10 reps         TREATMENT MINUTES COMMENTS   Evaluation 20    Self-care/ Home management     Manual therapy     Neuromuscular Re-education     Therapeutic Activity     Therapeutic Exercises 15 See flowsheet   Gait training 10 Normal ambulation with walker - patient was educated on using hip flexion to avoid toe drag, as well as hitting her heel first contact. Patient demonstrates lumbar flexion and decreased endurance - was educated that walker height is too high which makes her push it like a shopping cart - told to stand more inside the walker   Modality__________________                Total 45 Patient required extra time to fill out paperwork   Blank areas are intentional and mean the treatment did not include these items.     PT Evaluation Code: (Please list factors)  Patient History/Comorbidities: osteoporosis, fibromyalgia, morbid obesity, HTN, lumbar spondy  Examination: decreased mobility, strength, joint mobility, flexibility with poor posture  Clinical Presentation: uncomplicated  Clinical Decision Making: low    Patient History/  Comorbidities Examination  (body structures and functions, activity limitations, and/or participation restrictions) Clinical Presentation Clinical Decision Making (Complexity)   No documented Comorbidities or personal factors 1-2 Elements Stable and/or uncomplicated Low   1-2 documented comorbidities or personal factor 3 Elements Evolving clinical presentation with changing characteristics Moderate   3-4 documented comorbidities or personal factors 4 or more Unstable and unpredictable High                Iza Barrios, PT  10/6/2017  9:54 AM

## 2021-06-13 NOTE — PROGRESS NOTES
ASSESSMENT: Marlin Alejo is a 67 y.o. female with past medical history significant for osteoporosis, fibromyalgia, morbid obesity status post gastric bypass surgery, hypertension, frequent UTI, rheumatoid arthritis chronic knee pain, irritable bowel, GERD who presents today for new patient evaluation of chronic bilateral low back pain without radicular symptoms.  Patient has a history of a posterior instrumented fusion at L4-5 and 2007 with Dr. Osman.  The patient has not had any recent advanced imaging of her lumbar spine.  I suspect that the patient has adjacent segment degenerative changes at L3-4.  She seems most symptomatic from lumbar facet arthropathy.  The patient does not demonstrate any neurologic deficit on exam.    MATTIE: 42  WHO 5: 14    PLAN:  A shared decision making model was used.  The patient's values and choices were respected.  The following represents what was discussed and decided upon by the physician assistant and the patient.      1.  DIAGNOSTIC TESTS: I reviewed an x-ray of the lumbar spine from 2014.  I placed an order for an MRI lumbar spine for further evaluation.  The patient is unable to lay flat for scan so we will obtain an upright open MRI at [facility].    2.  PHYSICAL THERAPY:  Discussed the importance of core strengthening, ROM, stretching exercises with the patient and how each of these entities is important in decreasing pain.  Explained to the patient that the purpose of physical therapy is to teach the patient a home exercise program.  These exercises need to be performed every day in order to decrease pain and prevent future occurrences of pain.  I placed an order for the patient begin physical therapy at the [place] location above from rehab.    3.  MEDICATIONS: No changes are made to the patient's medications.  Uses OxyContin 10 mg once daily.  This is prescribed to her primary care provider.  She takes cyclobenzaprine at bedtime.  She also uses Cymbalta 30 mg in the  morning and 60 mg in the evening.    4.  INTERVENTIONS: No interventions were ordered.  The patient may benefit from interventional pain management if she fails to improve with conservative treatment, depending on the results of her MRI lumbar spine.  I will likely try targeting her facets first with facet joint injections and potentially radiofrequency ablation was failed to provide lasting relief.    5.  PATIENT EDUCATION: I told the patient that if she fails to improve with conservative treatment, we may have her follow back up with Dr. Osman.    -The patient was in agreement with the above plan.  All questions were answered.    6.  FOLLOW-UP:   A nurse will call patient with results of her MRI lumbar spine.  If she has any questions or concerns in the meantime, she should not hesitate to contact.      SUBJECTIVE:  Marlin Alejo  Is a 67 y.o. female who presents today in consultation at the request of Dr. Soto for new patient evaluation of low back pain.  The patient has a history of chronic low back pain.  She has had lumbar spine surgery ×2.  She had a lumbar decompression at L3-4 and L4-5.  She then underwent a posterior instrumented fusion at L4-5 with Dr. Osman in 2007 for spondylolisthesis.  The patient states that she did well for about 2 years after her surgery.  After that, her pain returned and has been gradually worsening since then.    The patient complains of bilateral lower back pain.  The pain spans across the low back above her surgical scar.  She denies any pain radiating into the buttocks and on the legs.  She describes the pain as a shooting pain.  Occasionally wraps all the way around the flanks and lower abdomen.  The pain is slightly worse on the right than the left.  The patient's pain is aggravated with walking, bending, and transitioning from a seated to a standing position.  Her pain is alleviated with sitting or laying down.  Stretching also helps to alleviate her  "pain.  She wears a back brace when she is out and about which helps somewhat with pain.  She does not wear the back brace at home.  The patient has intermittent numbness in the first and second toes of both feet.  She occasionally feels weakness in the right leg when attempting to get out of bed.  She states that she feels off balance.  She notices when she walks with her walker she ends up drifting to the left.  She is use a walker for stability for the last 4 years.  She has chronic urinary incontinence.  She denies loss of bowel control.    The patient to physical therapy before and after her fusion in 2000 7/2008.  She has not had any more recent physical therapy.  She does not go to the chiropractor.  The patient tried interventional pain management for her back pain prior to her fusion.  She has not had any injections since her fusion.  The patient gets acupuncture but that is for urinary incontinence, not for back pain.  The patient is currently using OxyContin 10 mg once daily.  This is managed by her primary care provider.  She also uses cyclobenzaprine at bedtime and Cymbalta 30 mg in the morning and 60 mg in the evening.    Current Outpatient Prescriptions on File Prior to Encounter   Medication Sig Dispense Refill     abatacept (ORENCIA, WITH MALTOSE,) 250 mg injection Infuse 1,000 mg into a venous catheter every 28 days.       ACETAMINOPHEN (TYLENOL ARTHRITIS ORAL) Take 650 mg by mouth daily as needed.       aspirin 81 MG EC tablet Take 40.5 mg by mouth 3 (three) times a week. 1/2 tab (40.5mg)       b complex vitamins tablet Take 1 tablet by mouth 3 (three) times a day.       BD TUBERCULIN SYRINGE 1 mL 27 x 1/2\" Syrg USE 1 SYRINGE AS DIRECTED ONCE A WEEK. 12 Syringe 1     calcium-vitamin D3-vitamin K (VIACTIV) 500-500-40 mg-unit-mcg Chew per chewable tablet Chew 1 tablet 2 (two) times a day.       cetirizine (ZYRTEC) 10 MG tablet Take 10 mg by mouth daily.       cholecalciferol, vitamin D3, (VITAMIN D3) " 2,000 unit cap Take 6,000 unit marking on U-100 syringe by mouth daily.       cranberry fruit (CRANBERRY) 475 mg cap Take 2 capsules by mouth daily.       cyanocobalamin, vitamin B-12, (VITAMIN B-12) 2,500 mcg Subl Place 1 tablet under the tongue daily.       cyclobenzaprine (FLEXERIL) 10 MG tablet Take 10 mg by mouth 3 (three) times a day as needed for muscle spasms.       DENOSUMAB (PROLIA SUBQ) Inject 60 mg under the skin every 6 (six) months.       docusate sodium (STOOL SOFTENER) 100 MG capsule Take 200 mg by mouth bedtime.       DULoxetine (CYMBALTA) 30 MG capsule TAKE ONE CAPSULE DAILY WITH 60MG CAPSULE  3     DULoxetine (CYMBALTA) 60 MG capsule Take 90 mg by mouth bedtime.        EPINEPHrine (EPIPEN) 0.3 mg/0.3 mL (1:1,000) atIn Inject into the shoulder, thigh, or buttocks once.       fluticasone (FLONASE) 50 mcg/actuation nasal spray 1 spray into each nostril daily.       folic acid (FOLVITE) 1 MG tablet Take 3 tablets (3 mg total) by mouth daily. 270 tablet 3     GLUC PEDRAZA/CHONDRO PEDRAZA A/VIT C/MN (GLUCOSAMINE CHONDROITIN MAXSTR ORAL) Take 1 tablet by mouth 2 times a day at 6:00 am and 4:00 pm.       lansoprazole (PREVACID) 15 MG capsule Take 15 mg by mouth daily.       levothyroxine (SYNTHROID, LEVOTHROID) 50 MCG tablet Take 50 mcg by mouth daily.       lidocaine (XYLOCAINE) 5 % ointment Apply topically as needed.       lisinopril (PRINIVIL,ZESTRIL) 10 MG tablet TAKE ONE TABLET DAILY FOR BLOOD PRESSURE  4     magnesium oxide 250 mg Tab Take 250 mg by mouth 2 (two) times a day.       methotrexate 25 mg/mL injection INJECT 0.6 ML (15 MG TOTAL) UNDER THE SKIN ONCE A WEEK. 8 mL 0     mirabegron (MYRBETRIQ) 25 mg Tb24 ER tablet Take 50 mg by mouth daily.        MULTIVITAMIN ORAL Take 1 tablet by mouth 2 times a day at 6:00 am and 4:00 pm. Flinstone chewables       nystatin (MYCOSTATIN) powder Apply topically 2 (two) times a day. 60 g 3     oxyCODONE (OXYCONTIN) 10 MG 12 hr tablet Take 10 mg by mouth every morning.  SHAILA - Brand only, pt allergic to generic       PAPAYA ENZYME ORAL Take 4 capsules by mouth daily.       phentermine (ADIPEX-P) 37.5 mg tablet 1/2 tab every morning. May increase to full tab every morning after 1 week. 90 tablet 0     polyethylene glycol (MIRALAX) 17 gram packet Take 17 g by mouth daily as needed.       sulfamethoxazole-trimethoprim (SEPTRA DS) 800-160 mg per tablet Take 1 tablet by mouth 2 (two) times a day.       trospium (SANCTURA) 20 mg tablet Take 20 mg by mouth 2 (two) times a day.       UNABLE TO FIND Med Name: leg cramps       vitamin E 400 UNIT capsule Take 400 Units by mouth 2 (two) times a day.         Allergies   Allergen Reactions     Bee Sting Kit      swelling     Codeine Hives     Mental changes     Lemonade Flavor      Oral sores     Neurontin [Gabapentin] Dizziness     GI upset     Nortriptyline Swelling     Nsaids (Non-Steroidal Anti-Inflammatory Drug)      S/p anastasia-en-y     Oxycodone Nausea And Vomiting     GENERIC (BRAND OKAY TO TAKE)     Strawberry      Oral sores     Talwin [Pentazocine Lactate] Nausea And Vomiting and Dizziness     Ultracet [Tramadol-Acetaminophen] Dizziness     Unable To Assess      Red Pepper - throat swelling       Ampicillin Rash       Past Medical History:   Diagnosis Date     Arthritis of back      Fibromyalgia, primary      Hand swelling      Osteoarthritis      Osteoporosis       Patient Active Problem List   Diagnosis     Osteoporosis     Fibromyalgia     Morbid obesity     Essential hypertension     Postoperative malabsorption     Frequent UTI     Primary osteoarthritis of both hands     Lumbar spondylosis     High risk medication use     Seronegative rheumatoid arthritis of multiple sites     Chronic pain of right knee     Bilateral primary osteoarthritis of knee         Past Surgical History:   Procedure Laterality Date     ANKLE LIGAMENT RECONSTRUCTION  1988     APPENDECTOMY  1972     BACK SURGERY       CERVICAL CIRCLAGES X3       CHOLECYSTECTOMY   2005     CYSTOCELE REPAIR  2005     EXPLORATORY LAPAROTOMY  1982    Right partial salpingectomy     gastric bypass  2007    Keesha-en-y     hemmorhoidectomy  2005     HYSTERECTOMY  1986    at age 36     LAMINECTOMY  2006    L3-4, L4-5     OOPHORECTOMY Bilateral 1986     PANNICULECTOMY  2009     POSTERIOR LAMINECTOMY / DECOMPRESSION LUMBAR SPINE  2009    L4-5     REPAIR RECTOCELE  2005     Right knee arthoscopy  2002       Family History   Problem Relation Age of Onset     Heart disease Mother      Colon cancer Father 68     Arthritis Sister      Hypertension Sister      Heart attack Brother      Heart disease Brother      Breast cancer Cousin 40     Cancer       Leukemia       Stroke       Heart disease       Hypertension       BRCA 1/2 Neg Hx      Endometrial cancer Neg Hx      Ovarian cancer Neg Hx        Social history: The patient is .  She is a homemaker.  She denies tobacco, alcohol, or illicit drug use.    ROS: Positive for changes in vision, swelling of feet, easy bruising, poor sleep quality, back pain, joint pain, leg pain.  Specifically negative for bowel/bladder dysfunction, fevers,chills, appetite changes, unexplained weight loss.   Otherwise 13 systems reviewed are negative.  Please see the patient's intake questionnaire from today for details.      OBJECTIVE:  PHYSICAL EXAMINATION:    CONSTITUTIONAL:  Vital signs as above.  No acute distress.  The patient is well nourished and well groomed.  Patient is morbidly obese.  PSYCHIATRIC:  The patient is awake, alert, oriented to person, place, time and answering questions appropriately with clear speech.    HEENT: Normocephalic, atraumatic.  Sclera clear.  Neck is supple.  SKIN:  Skin over the face, bilateral lower extremities, and posterior torso is clean, dry, intact without rashes.    GAIT:  Gait is, favoring the right.  Patient ambulates with a walker for assistance.   STANDING EXAMINATION: Tender to palpation of the bilateral lumbar paraspinous  muscles at approximately L3-4.  No tenderness palpation of the lower lumbar spine.  Lumbar flexion is minimally restricted.  Lumbar extension moderately restricted.  MUSCLE STRENGTH:  The patient has 5/5 strength for the bilateral hip flexors, knee flexors/extensors, ankle dorsiflexors/plantar flexors, great toe extensors, ankle evertors/invertors.  NEUROLOGICAL: Trace patellar, and absent Achilles reflexes bilaterally.  No ankle clonus bilaterally. Sensation to light touch is intact in the bilateral L4, L5, and S1 dermatomes.  VASCULAR:  1/4 posterior tibialis pulses bilaterally.  Bilateral lower extremities are warm.  There is no pitting edema of the bilateral lower extremities.  ABDOMINAL:  Soft, non-distended, non-tender throughout all quadrants.  No pulsatile mass palpated in the left lower quadrant.  LYMPH NODES:  No palpable or tender inguinal lymph nodes.  MUSCULOSKELETAL: Straight leg raise negative bilaterally.    RESULTS:    CR LUMBAR SPINE W FLEX EXT 5/13/2014 3:24 PM INDICATION: Worsening back pain. COMPARISON: 04/14/2009. FINDINGS: Mild convex right lumbar curve. Interval posterior fusion L4-L5 with posterior ese and pedicle screws. Advanced multilevel degenerative disc disease with disc space loss and interbody spurring, mildly progressive. There may be anterior fusion across L2-L3 disc space. Degenerative facet arthropathy. Slight retrolisthesis of L3 on L4 and L5 on S1 and anterolisthesis L4 on L5. No significant instability with flexion and extension. Degenerative changes in the visualized lower thoracic spine.

## 2021-06-13 NOTE — PROGRESS NOTES
[facility] Daily Progress     Patient Name: Marlin Alejo  Date: 10/25/2017  Visit #: 4/12  Referral Diagnosis: Chronic low back pain, lumbar fusion, balance problem, Physical deconditioning  Referring provider: Gwendolyn Mcrae PA-C  Visit Diagnosis:     ICD-10-CM    1. Lumbar spine pain M54.5    2. Generalized muscle weakness M62.81    3. Abnormal posture R29.3    4. Impaired functional mobility, balance, gait, and endurance Z74.09        Assessment:     Patient is a 67 y.o. female that presents with signs and symptoms consistent with low back pain, deconditioning and impaired gait secondary to lumbar fusion, poor posture and decreased core stability. Patient demonstrates impairments including decreased general strength, core stability, range of motion, flexibility and postural awareness, leading to impaired functional mobility.     Today patient reporting same pain, and her R knee pain is giving her the most difficulty with 8/10 pain, but back is doing better. Today's treatment was spent manual therapy for desensitization and to decrease her pain levels, as her knee is too painful to perform therapeutic activity and exercise. Patient appropriate to continue skilled PT and initial goals are appropriate; educated to continue with HEP and to rest RLE as needed. Patient and PT agreed next session will most likely be last for the patient as she is limited by R knee pain.    HEP/POC compliance is  fair, limited by RLE pain and low back pain.  Patient demonstrates understanding/independence with home program.  Patient is appropriate to continue with skilled physical therapy intervention, as indicated by initial plan of care.    Goal Status:  Pt. will be independent with home exercise program in : 4 weeks  Pt. will have improved quality of sleep: with less pain;waking less times/night;in 4 weeks  Pt. will show improved balance for safer : ambulation;standing;for dressing/grooming;for household  ambulation;for community ambulation;with minimal assistance;in 6 weeks  Pt. will improve posture : and demonstrate posture with minimal to no cuing;in sitting;in standing;in walking;in 6 weeks  Pt. will be able to walk : 10 minutes;on even surfaces;with no pain;with less difficulty;for household mobility;for community mobility;in 6 weeks  Pt. will bend: to dress;with less pain;with less difficulty;for self care;in 6 weeks  Patient will decrease : MATTIE score;for improved quality of function;for improved quality of life;in 6 weeks      Plan / Patient Education:     Continue with initial plan of care.  Progress with home program as tolerated.    Plan for next visit: supine bridge with hip adduction, step ups, sidestepping, gait mechanics w/out RW, sit to stands and balance ex    Subjective:     Patient reports she is having most pain in her R knee 8/10 pain and it hurts everytime she stands on it, her back is doing a little better 5/10 pain, and her balance is her biggest difficulty. Her R foot seems to feel like it wants to cross over her L foot and want to trip her.     MRI reveals spinal stenosis of L1-2, L3 is the free range and L4-5 have been fused. She is rolling over all the time in bed, but she is able to get a good 4 hours in.    Functional limitations are described as occurring with:   ascending and descending stairs or curbs - 13 steps at home  balance  Bending - sharp pains in back  performing routine daily activities  Sleeping - 2-3x, sleeps on her side until it bother declan  standing for any length of time - poor posture  transitional movements getting in  bed, chair and car, getting out of  bed, chair and car and sit to stand  walking for about 2 hours - poor posture with lumbar flexion, her knee really hurts on the R      Objective:     Palpation: TTP of R>L lumbar paraspinals and QL around L2-5  Flexibility: decreased of hamstrings, hip flexors, quad and ITB    Gait: patient demonstrates excessive  pronation of L ankle, with lumbar flexion relying on 4WW, patient was instructed to walk with heel first contact to avoid shuffling    Treatment Today       Patient Education: Patient was educated on continuing plan of care, progress and review of current HEP. Patient educated on importance of consistency with exercise and therapy, as well as activity modification in order to see change and improvements. Patient demonstrated and verbalized understanding.     Manual Therapy:  STM to R>L lumbar paraspinals and glute med in sidelying on R - increased pain that decreased with treatment  Lumbar mobilizations in sidelying to increase joint motion    Exercises:  Exercise #1: seated hip abduction with resistance band - 10 reps  Comment #1: seated hip adduction - 10 reps   Exercise #2: sit to stand transfers - 5-10 reps  Comment #2: step ups - 10-20 reps   Exercise #3: standing hip abduction- 10 reps (took extension out of HEP as inc Pain)  Comment #3: standing lunges in paralell bars - 10 reps on L      TREATMENT MINUTES COMMENTS   Evaluation     Self-care/ Home management     Manual therapy 16 STM to R>L lumbar paraspinals and glute med in sidelying on R - increased pain that decreased with treatment  Lumbar mobilizations in sidelying to increase joint motion   Neuromuscular Re-education     Therapeutic Activity     Therapeutic Exercises 11 See above flowsheet;      Gait training     Modality__________________                Total 27    Blank areas are intentional and mean the treatment did not include these items.       Iza Barrios, PT  10/25/2017

## 2021-06-13 NOTE — PROGRESS NOTES
New patient evaluation  --[facility]  --Hx of chronic lumbar pain  --Hx of lumbar fusion 2008 and laminectomy 1999 or 2000  --Today C/O ongoing B/L low back pain, worsening  --No radicular leg pain  --Rates back pain 8/10  --Also C/O other ongoing pain, such as, B/L shoulders, left wrist and right posterior knee pain  --PT in the past  --Saw [facility] 10-15 years ago and had some injections there  --I could not find any imaging with [facility] or [facility] but patient stated she has had MRI of cervical and lumbar spine in the past

## 2021-06-13 NOTE — PROGRESS NOTES
[facility] Daily Progress     Patient Name: Marlin Alejo  Date: 10/18/2017  Visit #: 3/12  Referral Diagnosis: Chronic low back pain, lumbar fusion, balance problem, Physical deconditioning  Referring provider: Gwendolyn Mcrae PA-C  Visit Diagnosis:     ICD-10-CM    1. Lumbar spine pain M54.5    2. Generalized muscle weakness M62.81    3. Abnormal posture R29.3    4. Impaired functional mobility, balance, gait, and endurance Z74.09        Assessment:     Patient is a 67 y.o. female that presents with signs and symptoms consistent with low back pain, deconditioning and impaired gait secondary to lumbar fusion, poor posture and decreased core stability. Patient demonstrates impairments including decreased general strength, core stability, range of motion, flexibility and postural awareness, leading to impaired functional mobility.     Today patient reporting same pain, and her R knee pain is giving her the most difficulty with 8/10 pain, but back is doing better. Today's treatment was spent on therapeutic exercise, gait training with using 4WW appropriately as patient had lowered the handles, and manual therapy for desensitization and to decrease her pain levels. Patient appropriate to continue skilled PT and initial goals are appropriate; educated to continue with HEP and to rest RLE as needed.    HEP/POC compliance is  fair, limited by RLE pain and low back pain.  Patient demonstrates understanding/independence with home program.  Patient is appropriate to continue with skilled physical therapy intervention, as indicated by initial plan of care.    Goal Status:  Pt. will be independent with home exercise program in : 4 weeks  Pt. will have improved quality of sleep: with less pain;waking less times/night;in 4 weeks  Pt. will show improved balance for safer : ambulation;standing;for dressing/grooming;for household ambulation;for community ambulation;with minimal assistance;in 6 weeks  Pt. will improve  posture : and demonstrate posture with minimal to no cuing;in sitting;in standing;in walking;in 6 weeks  Pt. will be able to walk : 10 minutes;on even surfaces;with no pain;with less difficulty;for household mobility;for community mobility;in 6 weeks  Pt. will bend: to dress;with less pain;with less difficulty;for self care;in 6 weeks  Patient will decrease : MATTIE score;for improved quality of function;for improved quality of life;in 6 weeks      Plan / Patient Education:     Continue with initial plan of care.  Progress with home program as tolerated.    Plan for next visit: supine bridge with hip adduction, step ups, sidestepping, gait mechanics w/out RW, sit to stands and balance ex    Subjective:     Patient reports she is having most pain in her R knee 8/10 pain, her back is doing a little better 5/10 pain, and her balance is her biggest difficulty. Back pain is 7-8/10 today, on the Nustep the R knee is a little touchy but the pain isn't traveling up or down - pain decreased with using more arm movement. MRI reveals spinal stenosis of L1-2, L3 is the free range and L4-5 have been fused. She is rolling over all the time in bed, but she is able to get a good 4 hours in.    Functional limitations are described as occurring with:   ascending and descending stairs or curbs - 13 steps at home  balance  Bending - sharp pains in back  performing routine daily activities  Sleeping - 2-3x, sleeps on her side until it bother declan  standing for any length of time - poor posture  transitional movements getting in  bed, chair and car, getting out of  bed, chair and car and sit to stand  walking for about 2 hours - poor posture with lumbar flexion, her knee really hurts on the R      Objective:     Palpation: TTP of R>L lumbar paraspinals and QL around L2-5  Flexibility: decreased of hamstrings, hip flexors, quad and ITB    Gait: patient demonstrates excessive pronation of L ankle, with lumbar flexion relying on 4WW, patient  "was instructed to walk with heel first contact to avoid shuffling    Treatment Today       Patient Education: Patient was educated on continuing plan of care, progress and review of current HEP. Patient educated on importance of consistency with exercise and therapy, as well as activity modification in order to see change and improvements. Patient demonstrated and verbalized understanding.     Manual Therapy:  STM to R>L lumbar paraspinals and glute med in sidelying on R - increased pain that decreased with treatment  Lumbar mobilizations in sidelying to increase joint motion    Exercises:  Exercise #1: seated hip abduction with resistance band - 10 reps  Comment #1: seated hip adduction - 10 reps   Exercise #2: sit to stand transfers - 5-10 reps  Comment #2: step ups - 10-20 reps   Exercise #3: standing hip abduction- 10 reps (took extension out of HEP as inc Pain)  Comment #3: standing lunges in paralell bars - 10 reps on L      TREATMENT MINUTES COMMENTS   Evaluation     Self-care/ Home management     Manual therapy 15 STM to R>L lumbar paraspinals and glute med in sidelying on R - increased pain that decreased with treatment  Lumbar mobilizations in sidelying to increase joint motion   Neuromuscular Re-education     Therapeutic Activity     Therapeutic Exercises 12 See above flowsheet; Nustep 5 minutes, resistance 1  Toe taps to 4\" step forward and lateral - 20 reps on R (unable to perform on L due to increased R knee pain)   Gait training     Modality__________________                Total 27    Blank areas are intentional and mean the treatment did not include these items.       Iza Barrios, PT  10/18/2017    "

## 2021-06-13 NOTE — TELEPHONE ENCOUNTER
Patient has Orencia infusion on Monday and will be having labs drawn at that time. 12/21/2020. She is not ready to schedule follow up today, she does not have her calendar right now. Will call back next week, patient will try to have calendar handy

## 2021-06-13 NOTE — PROGRESS NOTES
[facility] Discharge Summary  Patient Name: Marlin Alejo  Date: 12/5/2017  Referral Diagnosis: low back and balance  Referring provider: Gwendolyn Mcrae PA-C  Visit Diagnosis:   1. Lumbar spine pain     2. Generalized muscle weakness     3. Abnormal posture     4. Impaired functional mobility, balance, gait, and endurance         Goals:  Pt. will be independent with home exercise program in : 4 weeks  Pt. will have improved quality of sleep: with less pain;waking less times/night;in 4 weeks  Pt. will show improved balance for safer : ambulation;standing;for dressing/grooming;for household ambulation;for community ambulation;with minimal assistance;in 6 weeks  Pt. will improve posture : and demonstrate posture with minimal to no cuing;in sitting;in standing;in walking;in 6 weeks  Pt. will be able to walk : 10 minutes;on even surfaces;with no pain;with less difficulty;for household mobility;for community mobility;in 6 weeks  Pt. will bend: to dress;with less pain;with less difficulty;for self care;in 6 weeks  Patient will decrease : MATTIE score;for improved quality of function;for improved quality of life;in 6 weeks    Patient was seen for 5 visits for physical therapy of low back pain and balance problem from 10/6/17 to 11/1/17 with no follow up appointments.   The patient reports feeling better and did not wish to schedule further therapy at this time.  No further therapy is required at this time.  Patient is going to trial indpendence until she can return to PT s/p knee replacement.    Therapy will be discontinued at this time.  The patient will need a new referral to resume physical therapy treatment. Please see below for patient's current status.    Thank you for your referral.  Iza Barrios, PT, DPT  12/5/2017   3:09 PM      [facility] Daily Progress     Patient Name: Marlin Alejo  Date: 11/1/2017  Visit #: 5/12  Referral Diagnosis: Chronic low back pain, lumbar fusion, balance  problem, Physical deconditioning  Referring provider: Gwendolyn Mcrae PA-C  Visit Diagnosis:     ICD-10-CM    1. Lumbar spine pain M54.5    2. Generalized muscle weakness M62.81    3. Abnormal posture R29.3    4. Impaired functional mobility, balance, gait, and endurance Z74.09        Assessment:     Patient is a 67 y.o. female that presents with signs and symptoms consistent with low back pain, deconditioning and impaired gait secondary to lumbar fusion, poor posture and decreased core stability. Patient demonstrates impairments including decreased general strength, core stability, range of motion, flexibility and postural awareness, leading to impaired functional mobility.     Today patient reporting same pain, and her R knee pain is giving her the most difficulty with 8/10 pain, but back is doing better. Today's treatment was spent manual therapy for desensitization and to decrease her pain levels, as her knee is too painful to perform therapeutic activity and exercise. Patient appropriate to continue skilled PT and initial goals are appropriate; educated to continue with HEP and to rest RLE as needed. Patient and PT agreed this was patient's last session and she will trial independence until R knee is feeling better.    HEP/POC compliance is  fair, limited by RLE pain and low back pain.  Patient demonstrates understanding/independence with home program.  Patient is appropriate to continue with skilled physical therapy intervention, as indicated by initial plan of care.    Goal Status:  Pt. will be independent with home exercise program in : 4 weeks  Pt. will have improved quality of sleep: with less pain;waking less times/night;in 4 weeks  Pt. will show improved balance for safer : ambulation;standing;for dressing/grooming;for household ambulation;for community ambulation;with minimal assistance;in 6 weeks  Pt. will improve posture : and demonstrate posture with minimal to no cuing;in sitting;in standing;in  walking;in 6 weeks  Pt. will be able to walk : 10 minutes;on even surfaces;with no pain;with less difficulty;for household mobility;for community mobility;in 6 weeks  Pt. will bend: to dress;with less pain;with less difficulty;for self care;in 6 weeks  Patient will decrease : MATTIE score;for improved quality of function;for improved quality of life;in 6 weeks      Plan / Patient Education:     Continue with initial plan of care.  Progress with home program as tolerated.    Plan for next visit: supine bridge with hip adduction, step ups, sidestepping, gait mechanics w/out RW, sit to stands and balance ex    Subjective:     Patient reports she is having most pain in her R knee 5/10 pain and it hurts everytime she stands on it, her back is doing a little better 6-7/10 pain and it is bothering her more with more spasms R>L. Balance today has been pretty good.    MRI reveals spinal stenosis of L1-2, L3 is the free range and L4-5 have been fused. She is rolling over all the time in bed, but she is able to get a good 4 hours in.    Functional limitations are described as occurring with:   ascending and descending stairs or curbs - 13 steps at home  balance  Bending - sharp pains in back  performing routine daily activities  Sleeping - 2-3x, sleeps on her side until it bother declan  standing for any length of time - poor posture  transitional movements getting in  bed, chair and car, getting out of  bed, chair and car and sit to stand  walking for about 2 hours - poor posture with lumbar flexion, her knee really hurts on the R      Objective:     Palpation: TTP of R>L lumbar paraspinals and QL around L2-5  Flexibility: decreased of hamstrings, hip flexors, quad and ITB    Gait: patient demonstrates excessive pronation of L ankle, with lumbar flexion relying on 4WW, patient was instructed to walk with heel first contact to avoid shuffling    Treatment Today       Patient Education: Patient was educated on continuing plan of  care, progress and review of current HEP. Patient educated on importance of consistency with exercise and therapy, as well as activity modification in order to see change and improvements. Patient demonstrated and verbalized understanding.     Manual Therapy:  STM to R>L lumbar paraspinals and glute med in sidelying on R - increased pain that decreased with treatment  Lumbar mobilizations in sidelying to increase joint motion    Exercises:  Exercise #1: seated hip abduction with resistance band - 10 reps  Comment #1: seated hip adduction - Hold 5 sec  10 reps   Exercise #2: sit to stand transfers - 5-10 reps  Comment #2: step ups - 10-20 reps   Exercise #3: standing hip abduction- 10 reps (took extension out of HEP as inc Pain)  Comment #3: standing lunges in paralell bars - 10 reps on L      TREATMENT MINUTES COMMENTS   Evaluation     Self-care/ Home management     Manual therapy 25 STM to R>L lumbar paraspinals and glute med in sidelying on R - increased pain that decreased with treatment  Lumbar mobilizations in sidelying to increase joint motion   Neuromuscular Re-education     Therapeutic Activity     Therapeutic Exercises 5 See above flowsheet; Nustep 5 minutes - education on appropriate bed to sitting transfer     Gait training     Modality__________________                Total 30    Blank areas are intentional and mean the treatment did not include these items.       Iza Barrios, PT  11/1/2017

## 2021-06-13 NOTE — TELEPHONE ENCOUNTER
Left message to call back for: unable to leave vm for vm box is full  Information to relay to patient:  Pt needs to schedule a lab only appt and follow up with Dr. Cerna for med refills.

## 2021-06-13 NOTE — PROGRESS NOTES
[facility] Daily Progress     Patient Name: Marlin Alejo  Date: 10/10/2017  Visit #: 2/12  Referral Diagnosis: Chronic low back pain, lumbar fusion, balance problem, Physical deconditioning  Referring provider: Gwendolyn Mcrae PA-C  Visit Diagnosis:     ICD-10-CM    1. Lumbar spine pain M54.5    2. Generalized muscle weakness M62.81    3. Abnormal posture R29.3    4. Impaired functional mobility, balance, gait, and endurance Z74.09        Assessment:     Patient is a 67 y.o. female that presents with signs and symptoms consistent with low back pain, deconditioning and impaired gait secondary to lumbar fusion, poor posture and decreased core stability. Patient demonstrates impairments including decreased general strength, core stability, range of motion, flexibility and postural awareness, leading to impaired functional mobility.     Today patient reporting same pain, and her R knee pain is giving her the most difficulty. Today's treatment was spent on therapeutic exercise, gait training with using 4WW appropriately as patient had lowered the handles, and manual therapy for desensitization and to decrease her pain levels. Patient appropriate to continue skilled PT and initial goals are appropriate; educated to continue with HEP and to rest RLE as needed.    HEP/POC compliance is  fair, limited by RLE pain and low back pain.  Patient demonstrates understanding/independence with home program.  Patient is appropriate to continue with skilled physical therapy intervention, as indicated by initial plan of care.    Goal Status:  Pt. will be independent with home exercise program in : 4 weeks  Pt. will have improved quality of sleep: with less pain;waking less times/night;in 4 weeks  Pt. will show improved balance for safer : ambulation;standing;for dressing/grooming;for household ambulation;for community ambulation;with minimal assistance;in 6 weeks  Pt. will improve posture : and demonstrate posture with  minimal to no cuing;in sitting;in standing;in walking;in 6 weeks  Pt. will be able to walk : 10 minutes;on even surfaces;with no pain;with less difficulty;for household mobility;for community mobility;in 6 weeks  Pt. will bend: to dress;with less pain;with less difficulty;for self care;in 6 weeks  Patient will decrease : MATTIE score;for improved quality of function;for improved quality of life;in 6 weeks      Plan / Patient Education:     Continue with initial plan of care.  Progress with home program as tolerated.    Plan for next visit: supine bridge with hip adduction, step ups, sidestepping, gait mechanics w/out RW, sit to stands and balance ex    Subjective:     Patient reports she is having most pain in her R knee, she has difficulty with hip extension exercise because of this pain. Back pain is 7-8/10 today, on the Nustep the R knee is a little touchy but the pain isn't traveling up or down. Patient was educated to not use the pilates ring for her hip adduction exercises.    Functional limitations are described as occurring with:   ascending and descending stairs or curbs - 13 steps at home  balance  Bending - sharp pains in back  performing routine daily activities  Sleeping - 2-3x, sleeps on her side until it bother declan  standing for any length of time - poor posture  transitional movements getting in  bed, chair and car, getting out of  bed, chair and car and sit to stand  walking for about 2 hours - poor posture with lumbar flexion, her knee really hurts on the R      Objective:     Palpation: TTP of R>L lumbar paraspinals and QL around L2-5  Flexibility: decreased of hamstrings, hip flexors, quad and ITB    Gait: patient demonstrates excessive pronation of L ankle, with lumbar flexion relying on 4WW, patient was instructed to walk with heel first contact to avoid shuffling    Treatment Today       Patient Education: Patient was educated on continuing plan of care, progress and review of current HEP.  Patient educated on importance of consistency with exercise and therapy, as well as activity modification in order to see change and improvements. Patient demonstrated and verbalized understanding.     Manual Therapy:  STM to R>L lumbar paraspinals and glute med in sidelying on R - increased pain that decreased with treatment  Lumbar mobilizations in sidelying to increase joint motion    Exercises:  Exercise #1: seated hip abduction with resistance band - 10 reps  Comment #1: seated hip adduction - 10 reps   Exercise #2: sit to stand transfers - 5-10 reps  Comment #2: step ups - 10-20 reps   Exercise #3: standing hip abduction and extension - 10 reps       TREATMENT MINUTES COMMENTS   Evaluation     Self-care/ Home management     Manual therapy 15 STM to R>L lumbar paraspinals and glute med in sidelying on R - increased pain that decreased with treatment  Lumbar mobilizations in sidelying to increase joint motion   Neuromuscular Re-education     Therapeutic Activity     Therapeutic Exercises 13 See above flowsheet; Nustep 6 minutes, resistance 2   Gait training     Modality__________________                Total 28    Blank areas are intentional and mean the treatment did not include these items.       Iza Barrios, PT  10/10/2017

## 2021-06-13 NOTE — TELEPHONE ENCOUNTER
----- Message from Oksana Hogan RN sent at 12/10/2020  1:45 PM CST -----  Please call pt to schedule lab only appt and follow up with Dr Cerna for med refills

## 2021-06-14 NOTE — PROGRESS NOTES
Marlin Alejo who presents today with a chief complaint of  Consult RA f/u.      Joint Pains: yes  Location: low back, r knee  Onset: chronic  Intensity:  8/10  AM Stiffness: 60 Minutes  Alleviating/Aggravating Factors: Medications have been helpful. Oxycontin qd helpful.  Tolerating Meds: yes  Other:      ROS:  Patient denies having any chest pain, shortness of breath, + chronic stable cough for yrs, no: abdominal pain, nausea, vomiting, rashes, fevers, + oral ulcers, no recent infections.  Patient admits to having a good appetite      Problem List:  Patient Active Problem List   Diagnosis     Osteoporosis     Fibromyalgia     Morbid obesity     Essential hypertension     Postoperative malabsorption     Frequent UTI     Primary osteoarthritis of both hands     Lumbar spondylosis     High risk medication use     Seronegative rheumatoid arthritis of multiple sites     Chronic pain of right knee     Bilateral primary osteoarthritis of knee        PMH:   Past Medical History:   Diagnosis Date     Arthritis of back      Fibromyalgia, primary      Hand swelling      Osteoarthritis      Osteoporosis        Surgical History:  Past Surgical History:   Procedure Laterality Date     ANKLE LIGAMENT RECONSTRUCTION  1988     APPENDECTOMY  1972     BACK SURGERY       CERVICAL CIRCLAGES X3       CHOLECYSTECTOMY  2005     CYSTOCELE REPAIR  2005     EXPLORATORY LAPAROTOMY  1982    Right partial salpingectomy     gastric bypass  2007    Keesha-en-y     hemmorhoidectomy  2005     HYSTERECTOMY  1986    at age 36     LAMINECTOMY  2006    L3-4, L4-5     OOPHORECTOMY Bilateral 1986     PANNICULECTOMY  2009     POSTERIOR LAMINECTOMY / DECOMPRESSION LUMBAR SPINE  2009    L4-5     REPAIR RECTOCELE  2005     Right knee arthoscopy  2002       Family History:  Family History   Problem Relation Age of Onset     Heart disease Mother      Colon cancer Father 68     Arthritis Sister      Hypertension Sister      Heart attack Brother      Heart  "disease Brother      Breast cancer Cousin 40     Cancer       Leukemia       Stroke       Heart disease       Hypertension       BRCA 1/2 Neg Hx      Endometrial cancer Neg Hx      Ovarian cancer Neg Hx        Social History:   reports that she has never smoked. She has never used smokeless tobacco. She reports that she does not drink alcohol or use illicit drugs.    Allergies:  Allergies   Allergen Reactions     Bee Sting Kit      swelling     Codeine Hives     Mental changes     Lemonade Flavor      Oral sores     Neurontin [Gabapentin] Dizziness     GI upset     Nortriptyline Swelling     Nsaids (Non-Steroidal Anti-Inflammatory Drug)      S/p anastasia-en-y     Oxycodone Nausea And Vomiting     GENERIC (BRAND OKAY TO TAKE)     Strawberry      Oral sores     Talwin [Pentazocine Lactate] Nausea And Vomiting and Dizziness     Ultracet [Tramadol-Acetaminophen] Dizziness     Unable To Assess      Red Pepper - throat swelling       Ampicillin Rash        Current Medications:  Current Outpatient Prescriptions   Medication Sig Dispense Refill     abatacept (ORENCIA, WITH MALTOSE,) 250 mg injection Infuse 1,000 mg into a venous catheter every 28 days.       ACETAMINOPHEN (TYLENOL ARTHRITIS ORAL) Take 650 mg by mouth daily as needed.       aspirin 81 MG EC tablet Take 40.5 mg by mouth 3 (three) times a week. 1/2 tab (40.5mg)       b complex vitamins tablet Take 1 tablet by mouth 3 (three) times a day.       BD TUBERCULIN SYRINGE 1 mL 27 x 1/2\" Syrg USE 1 SYRINGE AS DIRECTED ONCE A WEEK. 12 Syringe 1     calcium-vitamin D3-vitamin K (VIACTIV) 500-500-40 mg-unit-mcg Chew per chewable tablet Chew 1 tablet 2 (two) times a day.       cetirizine (ZYRTEC) 10 MG tablet Take 10 mg by mouth daily.       cholecalciferol, vitamin D3, (VITAMIN D3) 2,000 unit cap Take 6,000 unit marking on U-100 syringe by mouth daily.       cranberry fruit (CRANBERRY) 475 mg cap Take 2 capsules by mouth daily.       cyanocobalamin, vitamin B-12, (VITAMIN " B-12) 2,500 mcg Subl Place 1 tablet under the tongue daily.       cyclobenzaprine (FLEXERIL) 10 MG tablet Take 10 mg by mouth 3 (three) times a day as needed for muscle spasms.       DENOSUMAB (PROLIA SUBQ) Inject 60 mg under the skin every 6 (six) months.       docusate sodium (STOOL SOFTENER) 100 MG capsule Take 200 mg by mouth bedtime.       DULoxetine (CYMBALTA) 30 MG capsule TAKE ONE CAPSULE DAILY WITH 60MG CAPSULE  3     DULoxetine (CYMBALTA) 60 MG capsule Take 90 mg by mouth bedtime.        EPINEPHrine (EPIPEN) 0.3 mg/0.3 mL (1:1,000) atIn Inject into the shoulder, thigh, or buttocks once.       fluticasone (FLONASE) 50 mcg/actuation nasal spray 1 spray into each nostril daily.       GLUC PEDRAZA/CHONDRO PEDRAZA A/VIT C/MN (GLUCOSAMINE CHONDROITIN MAXSTR ORAL) Take 1 tablet by mouth 2 times a day at 6:00 am and 4:00 pm.       lansoprazole (PREVACID) 15 MG capsule Take 15 mg by mouth daily.       levothyroxine (SYNTHROID, LEVOTHROID) 50 MCG tablet Take 50 mcg by mouth daily.       lidocaine (XYLOCAINE) 5 % ointment Apply topically as needed.       lisinopril (PRINIVIL,ZESTRIL) 10 MG tablet TAKE ONE TABLET DAILY FOR BLOOD PRESSURE  4     magnesium oxide 250 mg Tab Take 250 mg by mouth 2 (two) times a day.       methotrexate 25 mg/mL injection INJECT 0.6 ML (15 MG TOTAL) UNDER THE SKIN ONCE A WEEK. 8 mL 0     mirabegron (MYRBETRIQ) 25 mg Tb24 ER tablet Take 50 mg by mouth daily.        MULTIVITAMIN ORAL Take 1 tablet by mouth 2 times a day at 6:00 am and 4:00 pm. Flinstone chewables       nystatin (MYCOSTATIN) powder Apply topically 2 (two) times a day. 60 g 3     oxyCODONE (OXYCONTIN) 10 MG 12 hr tablet Take 10 mg by mouth every morning. SHAILA - Brand only, pt allergic to generic       phentermine (ADIPEX-P) 37.5 mg tablet 1/2 tab every morning. May increase to full tab every morning after 1 week. 90 tablet 0     polyethylene glycol (MIRALAX) 17 gram packet Take 17 g by mouth daily as needed.        "sulfamethoxazole-trimethoprim (SEPTRA DS) 800-160 mg per tablet Take 1 tablet by mouth 2 (two) times a day.       trospium (SANCTURA) 20 mg tablet Take 20 mg by mouth 2 (two) times a day.       vitamin E 400 UNIT capsule Take 400 Units by mouth 2 (two) times a day.       folic acid (FOLVITE) 1 MG tablet Take 3 tablets (3 mg total) by mouth daily. 270 tablet 3     PAPAYA ENZYME ORAL Take 4 capsules by mouth daily.       No current facility-administered medications for this visit.            Physical Exam:  /80  Pulse 72  Ht 5' 1.5\" (1.562 m)  Wt (!) 236 lb (107 kg)  BMI 43.87 kg/m2  General: A & O x 3 in NAD, overweight.  HEENT: EOMI, Non injected/non icteric sclera, no oral lesions noted  CV: s1s2 with RRR, no rubs appreciated   Lungs: CTA B/L, no wheezing , rales or rhonci appreciated  GI: Soft, NT/ND, no rebound, no guarding noted  MS: Hypertrophic changes noted involving PIP/DIP hand joints.  Passive flexion/extension of right knee reproduced some discomfort with no clear signs of synovitis noted. negative straight leg raising bilaterally.  Otherwise patient demonstrated good passive/active ROM over other joints with no warmth, erythema, tenderness or synovitis noted over these joints.      Assessment/Plan:    Was a patient of Dr. Roman, last seen July 2017.    Prior records reviewed.    1. Seronegative rheumatoid arthritis    States diagnosed with rheumatoid arthritis in 1990s, receiving benefit from combination of methotrexate 0.6 mL weekly and Orencia which she has been on for about 10 years now.  Will refill these meds, states is due to receive next Orencia infusion tomorrow.    We will check labs.  Made aware that she needs to have labs checked every 3 months to monitor for potential drug related toxicities.  Standing orders placed.    Continue folic acid, takes 2-4 mg daily, higher dosing because of oral ulcers.    - methotrexate 25 mg/mL injection; INJECT 0.6 ML (15 MG TOTAL) UNDER THE SKIN ONCE " A WEEK.  Dispense: 8 mL; Refill: 0  - HM2(CBC w/o Differential); Standing  - ALT (SGPT); Standing  - AST (SGOT); Standing  - Creatinine; Standing  - Albumin; Standing  - Erythrocyte Sedimentation Rate  - C-Reactive Protein  - abatacept (ORENCIA, WITH MALTOSE,) 250 mg injection; Infuse 1,000 mg into a venous catheter every 28 days.  Dispense: 1000 mg; Refill: 5  - syringe, disposable, 1 mL Syrg; Please provide 1 mL, 27-gauge syringes for injectable methotrexate use.  Dispense: 50 Syringe; Refill: 0    2. High risk medication use    Methotrexate and Orencia.    - HM2(CBC w/o Differential); Standing  - ALT (SGPT); Standing  - AST (SGOT); Standing  - Creatinine; Standing  - Albumin; Standing  - Erythrocyte Sedimentation Rate  - C-Reactive Protein    3. Osteoarthritis of right knee, unspecified osteoarthritis type    States cortisone injection and Euflexxa have not been helpful.    Finds OxyContin 1 tablet daily as needed helpful.    Deferred referral to orthopedics.    4. Fibromyalgia  On Cymbalta.    5. Osteoporosis  On Prolia from another provider.    6. History of gastric bypass  We will avoid NSAIDs.      Spent 40 minutes with greater than half of this time spent with the patient going over differential diagnosis, prognosis, treatment plan, medication side effects and  answering questions.    The patient is new to me however, is a follow-up to our rheumatology clinic.    This note was transcribed using Dragon voice recognition software as a result unintentional grammatical errors or word substitutions may have occurred.    Side effect profile of medications provided were discussed with the patient.      Quinten Cerna ....................  11/8/2017   1:25 PM

## 2021-06-14 NOTE — TELEPHONE ENCOUNTER
Pt vm is fulle.     Please give pt a call to schedule her next appt and lab. Thank you!!    - follow up in 4 months  -labs prior next visit

## 2021-06-14 NOTE — PROGRESS NOTES
Marlin Alejo who presents today with a chief complaint of  No chief complaint on file.      Joint Pains: yes  Location: right knee  Onset: years  Intensity:  5-6/10  AM Stiffness: 60 Minutes  Alleviating/Aggravating Factors: Unknown to pt.  Tolerating Meds: yes  Other:Pt reports drinking 6-8 glasses of water a day      ROS:  Patient denies having any chest pain, shortness of breath, cough, abdominal pain, nausea, vomiting, rashes, fevers, oral ulcers and recent infections.  Patient admits to having a good appetite      Problem List:  Patient Active Problem List   Diagnosis     Osteoporosis     Fibromyalgia     Morbid obesity (H)     Essential hypertension     Postoperative malabsorption     Frequent UTI     Primary osteoarthritis of both hands     Lumbar spondylosis     High risk medication use     Seronegative rheumatoid arthritis of multiple sites (H)     Chronic pain of right knee     Bilateral primary osteoarthritis of knee        PMH:   Past Medical History:   Diagnosis Date     Arthritis of back      Fibromyalgia, primary      Hand swelling      Osteoarthritis      Osteoporosis        Surgical History:  Past Surgical History:   Procedure Laterality Date     ANKLE LIGAMENT RECONSTRUCTION  1988     APPENDECTOMY  1972     BACK SURGERY       CERVICAL CIRCLAGES X3       CHOLECYSTECTOMY  2005     CYSTOCELE REPAIR  2005     EXPLORATORY LAPAROTOMY  1982    Right partial salpingectomy     gastric bypass  2007    Keesha-en-y     hemmorhoidectomy  2005     HYSTERECTOMY  1986    at age 36     LAMINECTOMY  2006    L3-4, L4-5     OOPHORECTOMY Bilateral 1986     PANNICULECTOMY  2009     POSTERIOR LAMINECTOMY / DECOMPRESSION LUMBAR SPINE  2009    L4-5     REPAIR RECTOCELE  2005     Right knee arthoscopy  2002       Family History:  Family History   Problem Relation Age of Onset     Heart disease Mother      Colon cancer Father 68     Arthritis Sister      Hypertension Sister      Heart attack Brother      Heart disease  Brother      Breast cancer Cousin 40     Cancer Unknown      Leukemia Unknown      Stroke Unknown      Heart disease Unknown      Hypertension Unknown      BRCA 1/2 Neg Hx      Endometrial cancer Neg Hx      Ovarian cancer Neg Hx        Social History:   reports that she has never smoked. She has never used smokeless tobacco. She reports that she does not drink alcohol or use drugs.    Allergies:  Allergies   Allergen Reactions     Bee Sting Kit      swelling     Codeine Hives     Mental changes     Lemonade Flavor      Oral sores     Neurontin [Gabapentin] Dizziness     GI upset     Nortriptyline Swelling     Nsaids (Non-Steroidal Anti-Inflammatory Drug)      S/p anastasia-en-y     Oxycodone Nausea And Vomiting     GENERIC (BRAND OKAY TO TAKE)     Strawberry      Oral sores     Talwin [Pentazocine Lactate] Nausea And Vomiting and Dizziness     Ultracet [Tramadol-Acetaminophen] Dizziness     Unable To Assess      Red Pepper - throat swelling       Ampicillin Rash        Current Medications:  Current Outpatient Medications   Medication Sig Dispense Refill     abatacept (ORENCIA, WITH MALTOSE,) 250 mg injection Infuse 1,000 mg into a venous catheter every 28 days. 1000 mg 5     ACETAMINOPHEN (TYLENOL ARTHRITIS ORAL) Take 650 mg by mouth daily as needed.       aspirin 81 MG EC tablet Take 40.5 mg by mouth 3 (three) times a week. 1/2 tab (40.5mg)       b complex vitamins tablet Take 1 tablet by mouth 3 (three) times a day.       calcium-vitamin D3-vitamin K (VIACTIV) 500-500-40 mg-unit-mcg Chew per chewable tablet Chew 1 tablet 2 (two) times a day.       cetirizine (ZYRTEC) 10 MG tablet Take 10 mg by mouth daily.       cholecalciferol, vitamin D3, (VITAMIN D3) 1,000 unit capsule Take 1,000 Units by mouth daily.       cranberry fruit (CRANBERRY) 475 mg cap Take 2 capsules by mouth daily.       cyanocobalamin, vitamin B-12, (VITAMIN B-12) 2,500 mcg Subl Place 1 tablet under the tongue daily.       cyclobenzaprine (FLEXERIL) 10  MG tablet Take 10 mg by mouth 3 (three) times a day as needed for muscle spasms.       DENOSUMAB (PROLIA SUBQ) Inject 60 mg under the skin every 6 (six) months.       diclofenac sodium (VOLTAREN) 1 % Gel Apply approximately 1/2-1 gm over affected hand joints tid-qid, as needed. 1 Tube 1     docusate sodium (STOOL SOFTENER) 100 MG capsule Take 200 mg by mouth bedtime.       DULoxetine (CYMBALTA) 30 MG capsule TAKE ONE CAPSULE DAILY WITH 60MG CAPSULE  3     DULoxetine (CYMBALTA) 60 MG capsule Take 90 mg by mouth bedtime.        EPINEPHrine (EPIPEN) 0.3 mg/0.3 mL (1:1,000) atIn Inject into the shoulder, thigh, or buttocks once.       fluticasone (FLONASE) 50 mcg/actuation nasal spray 1 spray into each nostril as needed.              folic acid (FOLVITE) 1 MG tablet Take 3 tablets (3,000 mcg total) by mouth daily. 270 tablet 0     hydroCHLOROthiazide (HYDRODIURIL) 12.5 MG tablet Take 12.5 mg by mouth daily.       lansoprazole (PREVACID) 15 MG capsule Take 15 mg by mouth daily.       levothyroxine (SYNTHROID, LEVOTHROID) 50 MCG tablet Take 50 mcg by mouth daily.       lidocaine (LIDODERM) 5 % Apply 1/2 to 1 patch over low back regions prn,  for up to 12 hrs. Then wait 12 hrs prior to applying another patch. 30 patch 2     lidocaine (XYLOCAINE) 5 % ointment Apply topically as needed.       magnesium oxide 250 mg Tab Take 250 mg by mouth 2 (two) times a day.       Medical Cannabis Use 1 Units As Directed. Take as instructed by the medical cannabis dispensary. The provider who enrolled the patient in the medical cannabis registry and certified this  patient will maintain ownership and liability of all provider reporting and registration requirements.       methotrexate 25 mg/mL injection Inject 0.8 mL (20 mg total) under the skin every 7 days. 4 mL 0     mirabegron (MYRBETRIQ) 25 mg Tb24 ER tablet Take 25 mg by mouth daily.        MULTIVITAMIN ORAL Take 1 tablet by mouth 2 times a day at 6:00 am and 4:00 pm. Ted  "chewables       pantoprazole (PROTONIX) 20 MG tablet Take 20 mg by mouth daily.       peg 400-propylene glycol (SYSTANE) 0.4-0.3 % Drop Administer 1 drop to both eyes 4 (four) times a day as needed.       polyethylene glycol (MIRALAX) 17 gram packet Take 17 g by mouth daily as needed.       sulfamethoxazole-trimethoprim (SEPTRA DS) 800-160 mg per tablet Take 1 tablet by mouth 2 (two) times a day Prn after intercourse.             syringe with needle (BD TUBERCULIN SYRINGE) 1 mL 27 x 1/2\" Syrg USE 1 SYRINGE AS DIRECTED ONCE A WEEK. 12 Syringe 0     syringe, disposable, 1 mL Syrg Please provide 1 mL, 27-gauge syringes for injectable methotrexate use. 50 Syringe 0     traMADol (ULTRAM) 50 mg tablet Take 1 tablet p.o.bid-three times a day, prn for moderate to severe pain. 75 tablet 2     trospium (SANCTURA) 20 mg tablet Take 20 mg by mouth 2 (two) times a day.       UNABLE TO FIND 2 tablets daily. Med Name:       UNABLE TO FIND 2 tablets daily. Med Name:       vitamin E 400 UNIT capsule Take 400 Units by mouth 2 (two) times a day.       Current Facility-Administered Medications   Medication Dose Route Frequency Provider Last Rate Last Admin     hyaluronate Syrg 48 mg  6 mL Intra-articular Once Quinten Cerna,          hyaluronate Syrg 48 mg  48 mg Intra-articular Once Quinten Cerna,                Physical Exam:  Following up today via phone visit, per Covid-19 pandemic requirements.       Verbal consent has been obtained for this service by a care team member.     Phone call start time: 6:06 PM     Phone call end time: 6:17 PM     Phone number utilized: [phone]      Summary/Assessment:    History that includes seronegative rheumatoid arthritis, osteoarthritis, osteoporosis, fibromyalgia, gastric bypass, chronic low back pains.    Presents for follow-up visit.      Has been experiencing resurfacing right knee pain, desires repeat injection of Synvisc 1 injection.  States last received " injection about a year ago with about 3 months worth of benefit.  Cortisone injection only provides about a week or 2 of benefit.    Patient's RA appears to be stable at this time with combination of methotrexate and Orencia.      Latest labs noted to be stable.    Please see below for management plan.      From prior note: For chronic low back pains.  Takes Tylenol and Flexeril (from another provider) twice daily with some benefit. Back brace also beneficial.  History low back surgery.     Patient due for repeat labs in about 2 weeks..       Pertinent rheumatology/past medical history (please refer to above for more detailed history):      Seronegative rheumatoid arthritis    High-risk medication use    Osteoarthritis    Fibromyalgia    History gastric bypass    Osteoporosis    Chronic low back pain/status post surgical fusion L4-L5 (history of spinal stenosis).    Recurrent oral ulcers (folic acid 3 mg daily helpful)      Rheumatology medications provided/suggested:    Methotrexate injectable   Folic acid  Orencia infusion  Synvisc 1      Pertinent medication from other providers or from otc (please refer to above for more detailed med list):    Flexeril  Cymbalta  Prolia  HCTZ   Vitamin D      Pertinent medications already tried:     Eufflexa (not helpful)  Medical marijuana  Tramadol    Pertinent lab history:      Noted to have negative/unremarkable: Rheumatoid factor, CCP antibody, LEONEL.      Pertinent imaging/test history:        Other:    Has a standing order for labs every 3 months good through December 2019.    On prior visit, suggested looking into obtaining a paraffin wax machine.     Plan:        Continue injectable methotrexate 0.8 mL weekly.  Made aware to hold dose if experiencing infection, can restart thereafter.    Continue folic acid 3 mg daily, except the day when taking methotrexate.  Higher dosing due to oral ulcers.    Continue Orencia monthly.    Continue Lidoderm patches for low back pains  "(\"already seen spine clinic\")    Continue Voltaren gel for hand pains.    We will avoid oral NSAIDs given history of gastric bypass.    On Prolia for another provider for osteoporosis.    On Cymbalta from another provider for fibromyalgia.    We will schedule patient for repeat Synvisc 1 right knee injection.    Recheck labs in 2 months, has prior to infusions.    Follow-up in 4 months.        This note was transcribed using Dragon voice recognition software as a result unintentional grammatical errors or word substitutions may have occurred. Please contact our Rheumatology department if you need any clarification or if you have any related inquiries.    Major side effect profile of medications provided were discussed with the patient.      Quinten Cerna DO   ....................  1/21/2021   1:11 PM      "

## 2021-06-14 NOTE — PROGRESS NOTES
"Pt admitted per pedis for her orencia. Pt states she is feeling pretty good as this medication keeps her at a good baseline until one week prior to infusion when it wears off and she is more uncomfortable. Pt busy crocheting during visit making caps out of \"expensive unique yarn\" IV started and med given without difficulty then dcd home.   "

## 2021-06-14 NOTE — TELEPHONE ENCOUNTER
Refilled per Rheum RN refill protocol for 30 day supply    Pt has f/u appt on 1/21/21 with Dr Cerna

## 2021-06-15 NOTE — PROGRESS NOTES
Patient presented to infusion for orencia. New orders obtained from provider as treatment plan had . Peripheral IV placed to left AC and labs sent. Orencia infused, tolerated well. Patient left clinic independently with use of FWW.

## 2021-06-15 NOTE — TELEPHONE ENCOUNTER
Lina from the [facility] infusion center called. Pt is coming in tomorrow 2/18 for her infusion and the orders need to be signed. They have not been resigned since 12/2019.

## 2021-06-15 NOTE — PROGRESS NOTES
Patient presents today for an injection/procedure visit.  Desires to have the following region injected: Right knee    Claims to still have active pains over this area.    Consent to treat form signed by the patient.  Patient's right knee was prepped in a sterile manner with an alcohol swab and ChloraPrep applicator.  Injected Synvisc 1 after lidocaine 1 ml into right knee joint.  Patient tolerated the procedure well with no complications noted.  Patient was advised to place ice over injection sites if sore over the next 24-48 hours. Oksana MACEDO assisted with procedure

## 2021-06-15 NOTE — TELEPHONE ENCOUNTER
Lina the RN from  infusion is calling again. The orders are still not signed and the pt is there to receive her infusion at the moment. Call Lina back at [phone].

## 2021-06-16 ENCOUNTER — RECORDS - HEALTHEAST (OUTPATIENT)
Dept: RHEUMATOLOGY | Facility: CLINIC | Age: 71
End: 2021-06-16

## 2021-06-16 DIAGNOSIS — M06.09 SERONEGATIVE RHEUMATOID ARTHRITIS OF MULTIPLE SITES (H): ICD-10-CM

## 2021-06-16 NOTE — PROGRESS NOTES
/73   Pulse 99   Temp 98  F (36.7  C) (Oral)   SpO2 96%     Patient presented to infusion for Orencia. Peripheral IV placed to left forearm with good blood return. Orencia infused, tolerated well. PIV removed and coban/gauze dressing placed. Patient left infusion center ambulating independently with use of 4WW.

## 2021-06-16 NOTE — TELEPHONE ENCOUNTER
Telephone Encounter by Aviva Carlos at 4/8/2019  1:48 PM     Author: Aviva Carlos Service: -- Author Type: --    Filed: 4/8/2019  1:49 PM Encounter Date: 4/4/2019 Status: Signed    : Aviva Carlos APPROVED:    Approval start date: 1/8/19  Approval end date: 4/7/20    Pharmacy has been notified of approval and will contact patient when medication is ready for pickup.

## 2021-06-16 NOTE — PROGRESS NOTES
Patient ambulated into [facility] with the use of a front wheeled walker. Patient is alert and oriented and VSS. A peripheral IV was inserted after two attempts.Patient received Orencia without any problems. Peripheral IV flushed with Normal Saline and discontinued. Dry 2 x 2 gauze wrapped with Coban around IV site and all questions answered. Patient was excited as her  was going to take her out to lunch today.

## 2021-06-16 NOTE — TELEPHONE ENCOUNTER
Telephone Encounter by Ana Maria Whitney at 9/24/2019  3:31 PM     Author: Ana Maria Whitney Service: -- Author Type: --    Filed: 9/24/2019  3:32 PM Encounter Date: 9/23/2019 Status: Signed    : Ana Maria Whitney APPROVED:    Approval start date:6/26/19  Approval end date:9/23/20    Pharmacy has been notified of approval and will contact patient when medication is ready for pickup.

## 2021-06-17 NOTE — PROGRESS NOTES
Marlin Alejo who presents today with a chief complaint of  Follow-up,joint pains.      Joint Pains:  Yes  Location: hands, low back  Onset: chronic  Intensity:  7/10  AM Stiffness: no wore in AM, can be all day in back  Alleviating/Aggravating Factors:   Tolerating Meds: yes  Other:      ROS:  Patient has chronic stable chest pain/shortness of breath/cough, no: abdominal pain, nausea, vomiting, rashes, fevers, oral ulcers and recent infections.  Patient admits to having a good yobani-+etite      Problem List:  Patient Active Problem List   Diagnosis     Osteoporosis     Fibromyalgia     Morbid obesity     Essential hypertension     Postoperative malabsorption     Frequent UTI     Primary osteoarthritis of both hands     Lumbar spondylosis     High risk medication use     Seronegative rheumatoid arthritis of multiple sites     Chronic pain of right knee     Bilateral primary osteoarthritis of knee        PMH:   Past Medical History:   Diagnosis Date     Arthritis of back      Fibromyalgia, primary      Hand swelling      Osteoarthritis      Osteoporosis        Surgical History:  Past Surgical History:   Procedure Laterality Date     ANKLE LIGAMENT RECONSTRUCTION  1988     APPENDECTOMY  1972     BACK SURGERY       CERVICAL CIRCLAGES X3       CHOLECYSTECTOMY  2005     CYSTOCELE REPAIR  2005     EXPLORATORY LAPAROTOMY  1982    Right partial salpingectomy     gastric bypass  2007    Keesha-en-y     hemmorhoidectomy  2005     HYSTERECTOMY  1986    at age 36     LAMINECTOMY  2006    L3-4, L4-5     OOPHORECTOMY Bilateral 1986     PANNICULECTOMY  2009     POSTERIOR LAMINECTOMY / DECOMPRESSION LUMBAR SPINE  2009    L4-5     REPAIR RECTOCELE  2005     Right knee arthoscopy  2002       Family History:  Family History   Problem Relation Age of Onset     Heart disease Mother      Colon cancer Father 68     Arthritis Sister      Hypertension Sister      Heart attack Brother      Heart disease Brother      Breast cancer Cousin 40      "Cancer       Leukemia       Stroke       Heart disease       Hypertension       BRCA 1/2 Neg Hx      Endometrial cancer Neg Hx      Ovarian cancer Neg Hx        Social History:   reports that she has never smoked. She has never used smokeless tobacco. She reports that she does not drink alcohol or use illicit drugs.    Allergies:  Allergies   Allergen Reactions     Bee Sting Kit      swelling     Codeine Hives     Mental changes     Lemonade Flavor      Oral sores     Neurontin [Gabapentin] Dizziness     GI upset     Nortriptyline Swelling     Nsaids (Non-Steroidal Anti-Inflammatory Drug)      S/p anastasia-en-y     Oxycodone Nausea And Vomiting     GENERIC (BRAND OKAY TO TAKE)     Strawberry      Oral sores     Talwin [Pentazocine Lactate] Nausea And Vomiting and Dizziness     Ultracet [Tramadol-Acetaminophen] Dizziness     Unable To Assess      Red Pepper - throat swelling       Ampicillin Rash        Current Medications:  Current Outpatient Prescriptions   Medication Sig Dispense Refill     abatacept (ORENCIA, WITH MALTOSE,) 250 mg injection Infuse 1,000 mg into a venous catheter every 28 days. 1000 mg 5     ACETAMINOPHEN (TYLENOL ARTHRITIS ORAL) Take 650 mg by mouth daily as needed.       aspirin 81 MG EC tablet Take 40.5 mg by mouth 3 (three) times a week. 1/2 tab (40.5mg)       b complex vitamins tablet Take 1 tablet by mouth 3 (three) times a day.       BD TUBERCULIN SYRINGE 1 mL 27 x 1/2\" Syrg USE 1 SYRINGE AS DIRECTED ONCE A WEEK. 12 Syringe 1     calcium-vitamin D3-vitamin K (VIACTIV) 500-500-40 mg-unit-mcg Chew per chewable tablet Chew 1 tablet 2 (two) times a day.       cetirizine (ZYRTEC) 10 MG tablet Take 10 mg by mouth daily.       cholecalciferol, vitamin D3, (VITAMIN D3) 1,000 unit capsule Take 1,000 Units by mouth daily.       cranberry fruit (CRANBERRY) 475 mg cap Take 2 capsules by mouth daily.       cyanocobalamin, vitamin B-12, (VITAMIN B-12) 2,500 mcg Subl Place 1 tablet under the tongue daily.   "     cyclobenzaprine (FLEXERIL) 10 MG tablet Take 10 mg by mouth 3 (three) times a day as needed for muscle spasms.       DENOSUMAB (PROLIA SUBQ) Inject 60 mg under the skin every 6 (six) months.       docusate sodium (STOOL SOFTENER) 100 MG capsule Take 200 mg by mouth bedtime.       DULoxetine (CYMBALTA) 30 MG capsule TAKE ONE CAPSULE DAILY WITH 60MG CAPSULE  3     DULoxetine (CYMBALTA) 60 MG capsule Take 90 mg by mouth bedtime.        EPINEPHrine (EPIPEN) 0.3 mg/0.3 mL (1:1,000) atIn Inject into the shoulder, thigh, or buttocks once.       fluticasone (FLONASE) 50 mcg/actuation nasal spray 1 spray into each nostril daily.       folic acid (FOLVITE) 1 MG tablet TAKE 3 TABLETS (3 MG TOTAL) BY MOUTH DAILY. 270 tablet 1     hydroCHLOROthiazide (HYDRODIURIL) 12.5 MG tablet Take 12.5 mg by mouth daily.       lansoprazole (PREVACID) 15 MG capsule Take 15 mg by mouth daily.       levothyroxine (SYNTHROID, LEVOTHROID) 50 MCG tablet Take 50 mcg by mouth daily.       lidocaine (XYLOCAINE) 5 % ointment Apply topically as needed.       losartan (COZAAR) 100 MG tablet Take 100 mg by mouth daily.       magnesium oxide 250 mg Tab Take 250 mg by mouth 2 (two) times a day.       Medical Cannabis Use 1 Units As Directed. Take as instructed by the medical cannabis dispensary. The provider who enrolled the patient in the medical cannabis registry and certified this  patient will maintain ownership and liability of all provider reporting and registration requirements.       methotrexate 25 mg/mL injection INJECT 0.6 ML (15 MG TOTAL) UNDER THE SKIN ONCE A WEEK. 8 mL 0     mirabegron (MYRBETRIQ) 25 mg Tb24 ER tablet Take 50 mg by mouth daily.        MULTIVITAMIN ORAL Take 1 tablet by mouth 2 times a day at 6:00 am and 4:00 pm. Flinstone chewables       oxyCODONE (OXYCONTIN) 10 MG 12 hr tablet Take 10 mg by mouth every morning. SHAILA - Brand only, pt allergic to generic       PAPAYA ENZYME ORAL Take 4 capsules by mouth daily.        "phentermine (ADIPEX-P) 37.5 mg tablet 1/2 tab every morning. May increase to full tab every morning after 1 week. 90 tablet 0     polyethylene glycol (MIRALAX) 17 gram packet Take 17 g by mouth daily as needed.       sulfamethoxazole-trimethoprim (SEPTRA DS) 800-160 mg per tablet Take 1 tablet by mouth 2 (two) times a day.       syringe, disposable, 1 mL Syrg Please provide 1 mL, 27-gauge syringes for injectable methotrexate use. 50 Syringe 0     trospium (SANCTURA) 20 mg tablet Take 20 mg by mouth 2 (two) times a day.       vitamin E 400 UNIT capsule Take 400 Units by mouth 2 (two) times a day.       folic acid (FOLVITE) 1 MG tablet Take 3 tablets (3 mg total) by mouth daily. 270 tablet 3     No current facility-administered medications for this visit.            Physical Exam:  /70  Pulse 96  Resp 10  Ht 5' 1.5\" (1.562 m)  Wt (!) 232 lb (105.2 kg)  Breastfeeding? No  BMI 43.13 kg/m2  General: A & O x 3 in NAD, overweight, ambulates with walker.  HEENT: EOMI, Non injected/non icteric sclera, no oral lesions noted  CV: s1s2 with RRR, no rubs appreciated   Lungs: CTA B/L, no wheezing , rales or rhonci appreciated  GI: Soft, NT/ND, no rebound, no guarding noted  MS: Mild discomfort on palpating left third PIP and right fifth PIP/DIP joints with no clear signs of synovitis noted, mild hypertrophic changes noted.  Mild discomfort on passive flexion/extension of right knee.  Otherwise patient demonstrated good passive/active ROM over other joints with no warmth, erythema, tenderness or synovitis noted over these joints.      Summary/Assessment:    History that includes seronegative rheumatoid arthritis, osteoporosis, osteoporosis, fibromyalgia, gastric bypass.    Presents for follow-up visit.  Patient's rheumatoid arthritis appears to be stable at this time, no clear signs of synovitis noted on exam today.      Combination of methotrexate and Orencia has been helpful.    Hand pains likely related to DJD.  "     Has chronic low back pains, history of low back surgery,  lately told to have spinal stenosis.        Pertinent rheumatology/past medical history (please refer to above for more detailed history):      Seronegative rheumatoid arthritis    High-risk medication use    Osteoarthritis    Fibromyalgia    History gastric bypass    Osteoporosis    Chronic low back pain/status post surgical fusion (history of spinal stenosis).        Rheumatology medications provided/suggested:    Methotrexate injectable  Orencia infusion      Pertinent medication from other providers or from otc (please refer to above for more detailed med list):    Medical marijuana  Cymbalta  Prolia  HCTZ   Vitamin D  OxyContin (tapering to off)    Pertinent medications already tried:     Eufflexa (not helpful)      Pertinent lab history:    Noted to have negative/unremarkable: Rheumatoid factor, CCP antibody, LEONEL.        Pertinent imaging/test history:          Other:    Has a standing order for labs every 3 months good through November 2018.    Plan:      Continue methotrexate 0.6 mL weekly.  Continue folic acid daily except the day when taking methotrexate.    Continue Orencia monthly.    Suggested looking into obtaining a paraffin wax machine.    Regarding back pains, has history of low back surgery.  Suggest she discuss further with surgeon that performed low back surgery, as adds how his stenosis.    On medical cannabis for pain relief and is tapering down on OxyContin with goal to off.    We will avoid oral and topical NSAIDs given history of gastric bypass.    On Prolia for another provider for osteoporosis.    On Cymbalta from another provider for fibromyalgia.    Recheck labs in about 10-11 weeks.    Follow-up in 4 months.      Procedure note:            Patient accompanied by spouse to this visit.    This note was transcribed using Dragon voice recognition software as a result unintentional grammatical errors or word substitutions may have  occurred. Please contact our Rheumatology department if you need any clarification or if you have any related inquiries.        Quinten Cerna ....................  4/30/2018   4:23 PM

## 2021-06-18 ENCOUNTER — RECORDS - HEALTHEAST (OUTPATIENT)
Dept: ADMINISTRATIVE | Facility: CLINIC | Age: 71
End: 2021-06-18

## 2021-06-19 NOTE — LETTER
Letter by Quinten Cerna DO at      Author: Quinten Cerna DO Service: -- Author Type: --    Filed:  Encounter Date: 3/13/2019 Status: (Other)       Marlin Alejo  [address] [place] [address]   March 13, 2019     Dear Ms. Alejo,    Below are the results from your recent visit:    Resulted Orders   ALT (SGPT)   Result Value Ref Range    ALT 27 0 - 45 U/L   Albumin   Result Value Ref Range    Albumin 3.2 (L) 3.5 - 5.0 g/dL   Creatinine   Result Value Ref Range    Creatinine 0.71 0.60 - 1.10 mg/dL    GFR MDRD Af Amer >60 >60 mL/min/1.73m2    GFR MDRD Non Af Amer >60 >60 mL/min/1.73m2   AST (SGOT)   Result Value Ref Range    AST 17 0 - 40 U/L   HM2(CBC w/o Differential)   Result Value Ref Range    WBC 9.0 4.0 - 11.0 thou/uL    RBC 4.17 3.80 - 5.40 mill/uL    Hemoglobin 13.0 12.0 - 16.0 g/dL    Hematocrit 39.8 35.0 - 47.0 %    MCV 95 80 - 100 fL    MCH 31.2 27.0 - 34.0 pg    MCHC 32.7 32.0 - 36.0 g/dL    RDW 14.4 11.0 - 14.5 %    Platelets 260 140 - 440 thou/uL    MPV 11.2 8.5 - 12.5 fL      Lab results noted to be stable.    Please call with questions or contact us using [employer].    Sincerely,      Electronically signed by Quinten Cerna DO

## 2021-06-19 NOTE — PROGRESS NOTES
Pt admitted per w/c with her crocheting and . States she can always tell when her orencia is due as she starts getting more achy. Labs drawn however call received from lab and ast hemolyzed so will need to redraw that level with next level. Pt dcd per w/c upon completion and after her iv dcd.

## 2021-06-20 NOTE — PROGRESS NOTES
Marlin Alejo who presents today with a chief complaint of  Follow-up, RA      Joint Pains: Yes  Location: right hip, right knee, low back  Onset: chronic  Intensity:  8/10  AM Stiffness: 30 Minutes  Alleviating/Aggravating Factors: .Orencia and mtx helpful.  Tolerating Meds: yes  Other:      ROS:  Patient has occasional stable chest pain with deep breathes, no: shortness of breath, cough, abdominal pain, nausea, vomiting, rashes, fevers, oral ulcers and recent infections.  Patient admits to having a good appetite      Problem List:  Patient Active Problem List   Diagnosis     Osteoporosis     Fibromyalgia     Morbid obesity (H)     Essential hypertension     Postoperative malabsorption     Frequent UTI     Primary osteoarthritis of both hands     Lumbar spondylosis     High risk medication use     Seronegative rheumatoid arthritis of multiple sites (H)     Chronic pain of right knee     Bilateral primary osteoarthritis of knee        PMH:   Past Medical History:   Diagnosis Date     Arthritis of back      Fibromyalgia, primary      Hand swelling      Osteoarthritis      Osteoporosis        Surgical History:  Past Surgical History:   Procedure Laterality Date     ANKLE LIGAMENT RECONSTRUCTION  1988     APPENDECTOMY  1972     BACK SURGERY       CERVICAL CIRCLAGES X3       CHOLECYSTECTOMY  2005     CYSTOCELE REPAIR  2005     EXPLORATORY LAPAROTOMY  1982    Right partial salpingectomy     gastric bypass  2007    Keesha-en-y     hemmorhoidectomy  2005     HYSTERECTOMY  1986    at age 36     LAMINECTOMY  2006    L3-4, L4-5     OOPHORECTOMY Bilateral 1986     PANNICULECTOMY  2009     POSTERIOR LAMINECTOMY / DECOMPRESSION LUMBAR SPINE  2009    L4-5     REPAIR RECTOCELE  2005     Right knee arthoscopy  2002       Family History:  Family History   Problem Relation Age of Onset     Heart disease Mother      Colon cancer Father 68     Arthritis Sister      Hypertension Sister      Heart attack Brother      Heart disease Brother  "     Breast cancer Cousin 40     Cancer       Leukemia       Stroke       Heart disease       Hypertension       BRCA 1/2 Neg Hx      Endometrial cancer Neg Hx      Ovarian cancer Neg Hx        Social History:   reports that she has never smoked. She has never used smokeless tobacco. She reports that she does not drink alcohol or use illicit drugs.    Allergies:  Allergies   Allergen Reactions     Bee Sting Kit      swelling     Codeine Hives     Mental changes     Lemonade Flavor      Oral sores     Neurontin [Gabapentin] Dizziness     GI upset     Nortriptyline Swelling     Nsaids (Non-Steroidal Anti-Inflammatory Drug)      S/p anastasia-en-y     Oxycodone Nausea And Vomiting     GENERIC (BRAND OKAY TO TAKE)     Strawberry      Oral sores     Talwin [Pentazocine Lactate] Nausea And Vomiting and Dizziness     Ultracet [Tramadol-Acetaminophen] Dizziness     Unable To Assess      Red Pepper - throat swelling       Ampicillin Rash        Current Medications:  Current Outpatient Prescriptions   Medication Sig Dispense Refill     abatacept (ORENCIA, WITH MALTOSE,) 250 mg injection Infuse 1,000 mg into a venous catheter every 28 days. 1000 mg 5     ACETAMINOPHEN (TYLENOL ARTHRITIS ORAL) Take 650 mg by mouth daily as needed.       aspirin 81 MG EC tablet Take 40.5 mg by mouth 3 (three) times a week. 1/2 tab (40.5mg)       b complex vitamins tablet Take 1 tablet by mouth 3 (three) times a day.       BD TUBERCULIN SYRINGE 1 mL 27 x 1/2\" Syrg USE 1 SYRINGE AS DIRECTED ONCE A WEEK. 12 Syringe 1     calcium-vitamin D3-vitamin K (VIACTIV) 500-500-40 mg-unit-mcg Chew per chewable tablet Chew 1 tablet 2 (two) times a day.       cetirizine (ZYRTEC) 10 MG tablet Take 10 mg by mouth daily.       cholecalciferol, vitamin D3, (VITAMIN D3) 1,000 unit capsule Take 1,000 Units by mouth daily.       cranberry fruit (CRANBERRY) 475 mg cap Take 2 capsules by mouth daily.       cyanocobalamin, vitamin B-12, (VITAMIN B-12) 2,500 mcg Subl Place 1 " tablet under the tongue daily.       cyclobenzaprine (FLEXERIL) 10 MG tablet Take 10 mg by mouth 3 (three) times a day as needed for muscle spasms.       DENOSUMAB (PROLIA SUBQ) Inject 60 mg under the skin every 6 (six) months.       docusate sodium (STOOL SOFTENER) 100 MG capsule Take 200 mg by mouth bedtime.       DULoxetine (CYMBALTA) 30 MG capsule TAKE ONE CAPSULE DAILY WITH 60MG CAPSULE  3     DULoxetine (CYMBALTA) 60 MG capsule Take 90 mg by mouth bedtime.        EPINEPHrine (EPIPEN) 0.3 mg/0.3 mL (1:1,000) atIn Inject into the shoulder, thigh, or buttocks once.       fluticasone (FLONASE) 50 mcg/actuation nasal spray 1 spray into each nostril daily.       folic acid (FOLVITE) 1 MG tablet TAKE 3 TABLETS (3 MG TOTAL) BY MOUTH DAILY. 270 tablet 1     lansoprazole (PREVACID) 15 MG capsule Take 15 mg by mouth daily.       levothyroxine (SYNTHROID, LEVOTHROID) 50 MCG tablet Take 50 mcg by mouth daily.       lidocaine (XYLOCAINE) 5 % ointment Apply topically as needed.       magnesium oxide 250 mg Tab Take 250 mg by mouth 2 (two) times a day.       Medical Cannabis Use 1 Units As Directed. Take as instructed by the medical cannabis dispensary. The provider who enrolled the patient in the medical cannabis registry and certified this  patient will maintain ownership and liability of all provider reporting and registration requirements.       methotrexate 25 mg/mL injection INJECT 0.6 ML (15 MG TOTAL) UNDER THE SKIN ONCE A WEEK. 8 mL 0     mirabegron (MYRBETRIQ) 25 mg Tb24 ER tablet Take 50 mg by mouth daily.        MULTIVITAMIN ORAL Take 1 tablet by mouth 2 times a day at 6:00 am and 4:00 pm. Flinstone chewables       PAPAYA ENZYME ORAL Take 4 capsules by mouth daily.       phentermine (ADIPEX-P) 37.5 mg tablet 1/2 tab every morning. May increase to full tab every morning after 1 week. 90 tablet 0     polyethylene glycol (MIRALAX) 17 gram packet Take 17 g by mouth daily as needed.       sulfamethoxazole-trimethoprim  "(SEPTRA DS) 800-160 mg per tablet Take 1 tablet by mouth 2 (two) times a day.       syringe, disposable, 1 mL Syrg Please provide 1 mL, 27-gauge syringes for injectable methotrexate use. 50 Syringe 0     trospium (SANCTURA) 20 mg tablet Take 20 mg by mouth 2 (two) times a day.       vitamin E 400 UNIT capsule Take 400 Units by mouth 2 (two) times a day.       folic acid (FOLVITE) 1 MG tablet Take 3 tablets (3 mg total) by mouth daily. 270 tablet 3     losartan (COZAAR) 100 MG tablet Take 100 mg by mouth daily.       oxyCODONE (OXYCONTIN) 10 MG 12 hr tablet Take 10 mg by mouth every morning. SHAILA - Brand only, pt allergic to generic       No current facility-administered medications for this visit.            Physical Exam:  /82  Pulse 86  Resp 10  Ht 5' 1.5\" (1.562 m)  Wt (!) 232 lb (105.2 kg)  SpO2 96%  Breastfeeding? No  BMI 43.13 kg/m2  General: A & O x 3 in NAD, overweight.  HEENT: EOMI, Non injected/non icteric sclera, no oral lesions noted  CV: s1s2 with RRR, no rubs appreciated   Lungs: CTA B/L, no wheezing , rales or rhonci appreciated  GI: Soft, NT/ND, no rebound, no guarding noted  MS: Subtle fullness noted involving second/third MCPs on the left with some mild tenderness palpation.  Also tender on palpating left first MCP joint with questionable subtle fullness.  Hypertrophic changes noted involving hand joints.  Positive discomfort involving shoulders and passive range of motion testing bilaterally.  Passive range of motion testing of knees and hips did not reproduce any pains today.  Negative straight leg raising bilaterally.  Otherwise patient demonstrated good passive/active ROM over other joints with no warmth, erythema, tenderness or synovitis noted over these joints.          Summary/Assessment:    History that includes seronegative rheumatoid arthritis, osteoarthritis, osteoporosis, fibromyalgia, gastric bypass.    Presents for follow-up visit.      Has some mild fullness noted " "involving left second/third MCP joints which were tender to palpation hence may have active RA.      Currently on combination of of methotrexate and Orencia.    Some of her hand pains may also likely be related to DJD.      Has chronic low back pains, history of low back surgery,  lately told to have spinal stenosis.  Has already seen spine clinic.    Receives medical marijuana from pain clinic.  States now off of narcotics.    Please see below for management plan.      Pertinent rheumatology/past medical history (please refer to above for more detailed history):      Seronegative rheumatoid arthritis    High-risk medication use    Osteoarthritis    Fibromyalgia    History gastric bypass    Osteoporosis    Chronic low back pain/status post surgical fusion (history of spinal stenosis).        Rheumatology medications provided/suggested:    Methotrexate injectable  Orencia infusion      Pertinent medication from other providers or from otc (please refer to above for more detailed med list):    Medical marijuana  Cymbalta  Prolia  HCTZ   Vitamin D      Pertinent medications already tried:     Eufflexa (not helpful)      Pertinent lab history:    Noted to have negative/unremarkable: Rheumatoid factor, CCP antibody, LEONEL.      Pertinent imaging/test history:        Other:    Has a standing order for labs every 3 months good through November 2018.    Plan:      We will increase injectable methotrexate from 0.6 mL to 0.8 mL weekly.      Given occasional oral ulcers recommend increasing folic acid from 2 mg daily to 3 mg daily, except the day when taking methotrexate.    Continue Orencia monthly.    On prior visit, suggested looking into obtaining a paraffin wax machine.    We will provide Lidoderm patches for low back pains (\"already seen spine clinic\")    On medical cannabis via pain clinic.    We will avoid oral and topical NSAIDs given history of gastric bypass.    On Prolia for another provider for osteoporosis.    On " Cymbalta from another provider for fibromyalgia.    Had lengthy discussion with the patient regarding trying Synvisc 1 for chronic right knee pains.  Believes had Euflexxa, series of 3 injections in the past and cortisone with insufficient benefit.  Does not plan on having replacement surgery in the immediate future.    Recheck labs in about 4 weeks.    Follow-up in 3 months.      Procedure note:       Spent 40 minutes with greater than half of this time spent with the patient going over differential diagnosis, prognosis, treatment plan, medication side effects and answering questions.    This note was transcribed using Dragon voice recognition software as a result unintentional grammatical errors or word substitutions may have occurred. Please contact our Rheumatology department if you need any clarification or if you have any related inquiries.    Major side effect profile of medications provided were discussed with the patient.      Quinten Cerna ....................  8/29/2018   10:25 AM

## 2021-06-21 NOTE — PROGRESS NOTES
Patient presents today for a procedure visit, as still has active pains involving knee joint(s).    Consent to treat form signed by the patient.  Patient's right knee was prepped in a sterile manner with an alcohol swab and ChloraPrep applicator.  Injected 1.5 mL of lidocaine followed by Synvisc 1 into right knee.  Patient tolerated the procedure well with no complications noted.  Patient was advised to place ice over injection sites if sore over the next 24-48 hours. Ivon YEN assisted with procedure      With regards to procedure consent, in addition to having patient sign a consent form, patient was made aware prior to injection that with an injection there is a possibility of nicking with the needle: a nerve, tendon, ligament, blood vessel, cartilage, soft tissue and if this were to occur typically the affected tissue should seal up with time.      In the event that patient has worsening pain post injection, then patient is to contact us so injected region can be reassessed for possible signs of infection versus other etiologies.    Patient expressed an understanding to the above and was agreeable to proceed with injection.

## 2021-06-21 NOTE — PROGRESS NOTES
Pt presented for orencia for her rheumatoid arthritis today. Pt iv started and orencia given. Pt tolerated well and discharged home with  using her walker. Anabela Edmondson RN

## 2021-06-22 ENCOUNTER — COMMUNICATION - HEALTHEAST (OUTPATIENT)
Dept: RHEUMATOLOGY | Facility: CLINIC | Age: 71
End: 2021-06-22

## 2021-06-22 DIAGNOSIS — M06.9 RHEUMATOID ARTHRITIS (H): ICD-10-CM

## 2021-06-22 RX ORDER — NEEDLES, SAFETY 22GX1 1/2"
NEEDLE, DISPOSABLE MISCELLANEOUS
Qty: 12 SYRINGE | Refills: 0 | Status: SHIPPED | OUTPATIENT
Start: 2021-06-22

## 2021-06-23 NOTE — TELEPHONE ENCOUNTER
Patient needs labs prior to her refill.    I called patient back and notified her that we will be unable to refill her meds until the results are in. She verbalized understanding. She will call us when she is ready to schedule. She just had cataract surgery and has to have her  drive her. She also usually does her labs with her infusions. So if she cannot get in soon for her infusions she will do them separate. She has our number to call.

## 2021-06-23 NOTE — TELEPHONE ENCOUNTER
Nehemiah    Please refill: methotrexate 25 mg/mL injection    Supply as 30 day or 90 days? 30 days    Please send RX to:  [facility] #[id number] - [place] -  [facility]    Is patient out of medication? NO  - How much meds. Left? Few days    Does patient want a call back when completed? NO, only if any questions @ [phone]  - Okay to leave a detailed message? YES      Appt schedueld as:  Date: 4/3/2019 Status: [facility]   Time: 11:30 AM Length: 30   Visit Type: OFFICE VISIT [[id number]] Copay: $0.00   Provider: Quinten Cerna DO Department: [facility]   Referring Provider: RACHAEL DAS CSN: [id number]   Enc Form #: [id number]       Notes: 3 mth follow up -8/28

## 2021-06-23 NOTE — TELEPHONE ENCOUNTER
Date: 2/27/2019 Status: [facility]   Time: 10:00 AM  10:00 AM Length: 90  30   Visit Type: INFUSION 1.5 HR [[id number]] Copay: $0.00   Provider: INDRA INFUSION CHAIR 2  [facility] INFUSION NURSE Department: [facility]  [facility]   Referring Provider: PEACE CARCAMO CSN: [id number]   Notes: tameka

## 2021-06-24 NOTE — TELEPHONE ENCOUNTER
Last ov-10/24/18  Last labs- 3/11/19    F/u appt 4/3/19    Please advise refills, outside of RN refill protocol range   Patrick

## 2021-06-25 DIAGNOSIS — M06.09 SERONEGATIVE RHEUMATOID ARTHRITIS OF MULTIPLE SITES (H): Primary | ICD-10-CM

## 2021-06-25 RX ORDER — DIPHENHYDRAMINE HYDROCHLORIDE 50 MG/ML
50 INJECTION INTRAMUSCULAR; INTRAVENOUS
Status: CANCELLED
Start: 2021-07-19

## 2021-06-25 RX ORDER — ALBUTEROL SULFATE 0.83 MG/ML
2.5 SOLUTION RESPIRATORY (INHALATION)
Status: CANCELLED | OUTPATIENT
Start: 2021-07-19

## 2021-06-25 RX ORDER — ACETAMINOPHEN 325 MG/1
650 TABLET ORAL ONCE
Status: CANCELLED
Start: 2021-07-19 | End: 2021-07-19

## 2021-06-25 RX ORDER — ALBUTEROL SULFATE 90 UG/1
1-2 AEROSOL, METERED RESPIRATORY (INHALATION)
Status: CANCELLED
Start: 2021-07-19

## 2021-06-25 RX ORDER — EPINEPHRINE 1 MG/ML
0.3 INJECTION, SOLUTION, CONCENTRATE INTRAVENOUS EVERY 5 MIN PRN
Status: CANCELLED | OUTPATIENT
Start: 2021-07-19

## 2021-06-25 RX ORDER — MEPERIDINE HYDROCHLORIDE 25 MG/ML
25 INJECTION INTRAMUSCULAR; INTRAVENOUS; SUBCUTANEOUS EVERY 30 MIN PRN
Status: CANCELLED | OUTPATIENT
Start: 2021-07-19

## 2021-06-25 RX ORDER — METHYLPREDNISOLONE SODIUM SUCCINATE 125 MG/2ML
125 INJECTION, POWDER, LYOPHILIZED, FOR SOLUTION INTRAMUSCULAR; INTRAVENOUS
Status: CANCELLED
Start: 2021-07-19

## 2021-06-25 RX ORDER — HEPARIN SODIUM,PORCINE 10 UNIT/ML
5 VIAL (ML) INTRAVENOUS
Status: CANCELLED | OUTPATIENT
Start: 2021-07-19

## 2021-06-25 RX ORDER — NALOXONE HYDROCHLORIDE 0.4 MG/ML
0.2 INJECTION, SOLUTION INTRAMUSCULAR; INTRAVENOUS; SUBCUTANEOUS
Status: CANCELLED | OUTPATIENT
Start: 2021-07-19

## 2021-06-25 RX ORDER — HEPARIN SODIUM (PORCINE) LOCK FLUSH IV SOLN 100 UNIT/ML 100 UNIT/ML
5 SOLUTION INTRAVENOUS
Status: CANCELLED | OUTPATIENT
Start: 2021-07-19

## 2021-06-25 NOTE — PROGRESS NOTES
Marlin Alejo who presents today with a chief complaint of  No chief complaint on file.      Joint Pains: yes  Location: right knee and back (s/p surgery)  Onset: years  Intensity:  6/10  AM Stiffness: 60 Minutes  Alleviating/Aggravating Factors: exercise and activities make it worse. Meds help  Tolerating Meds: yes  Other:      ROS:  Patient denies having any chest pain, shortness of breath, cough + (due to allergy), abdominal pain, nausea, vomiting, rashes, fevers, oral ulcers and recent infections.  Patient admits to having a good appetite      Problem List:  Patient Active Problem List   Diagnosis     Osteoporosis     Fibromyalgia     Morbid obesity (H)     Essential hypertension     Postoperative malabsorption     Frequent UTI     Primary osteoarthritis of both hands     Lumbar spondylosis     High risk medication use     Seronegative rheumatoid arthritis of multiple sites (H)     Chronic pain of right knee     Bilateral primary osteoarthritis of knee        PMH:   Past Medical History:   Diagnosis Date     Arthritis of back      Fibromyalgia, primary      Hand swelling      Osteoarthritis      Osteoporosis        Surgical History:  Past Surgical History:   Procedure Laterality Date     ANKLE LIGAMENT RECONSTRUCTION  1988     APPENDECTOMY  1972     BACK SURGERY       CERVICAL CIRCLAGES X3       CHOLECYSTECTOMY  2005     CYSTOCELE REPAIR  2005     EXPLORATORY LAPAROTOMY  1982    Right partial salpingectomy     gastric bypass  2007    Keesha-en-y     hemmorhoidectomy  2005     HYSTERECTOMY  1986    at age 36     LAMINECTOMY  2006    L3-4, L4-5     OOPHORECTOMY Bilateral 1986     PANNICULECTOMY  2009     POSTERIOR LAMINECTOMY / DECOMPRESSION LUMBAR SPINE  2009    L4-5     REPAIR RECTOCELE  2005     Right knee arthoscopy  2002       Family History:  Family History   Problem Relation Age of Onset     Heart disease Mother      Colon cancer Father 68     Arthritis Sister      Hypertension Sister      Heart attack  Brother      Heart disease Brother      Breast cancer Cousin 40     Cancer Unknown      Leukemia Unknown      Stroke Unknown      Heart disease Unknown      Hypertension Unknown      BRCA 1/2 Neg Hx      Endometrial cancer Neg Hx      Ovarian cancer Neg Hx        Social History:   reports that she has never smoked. She has never used smokeless tobacco. She reports that she does not drink alcohol or use drugs.    Allergies:  Allergies   Allergen Reactions     Bee Sting Kit      swelling     Codeine Hives     Mental changes     Lemonade Flavor      Oral sores     Neurontin [Gabapentin] Dizziness     GI upset     Nortriptyline Swelling     Nsaids (Non-Steroidal Anti-Inflammatory Drug)      S/p anastasia-en-y     Oxycodone Nausea And Vomiting     GENERIC (BRAND OKAY TO TAKE)     Strawberry      Oral sores     Talwin Compound Other (See Comments)     Talwin [Pentazocine Lactate] Nausea And Vomiting and Dizziness     Ultracet [Tramadol-Acetaminophen] Dizziness     Unable To Assess      Red Pepper - throat swelling       Ampicillin Rash        Current Medications:  Current Outpatient Medications   Medication Sig Dispense Refill     abatacept (ORENCIA, WITH MALTOSE,) 250 mg injection Infuse 1,000 mg into a venous catheter every 28 days. 1000 mg 5     ACETAMINOPHEN (TYLENOL ARTHRITIS ORAL) Take 650 mg by mouth daily as needed.       aspirin 81 MG EC tablet Take 40.5 mg by mouth 3 (three) times a week. 1/2 tab (40.5mg)       b complex vitamins tablet Take 1 tablet by mouth 3 (three) times a day.       calcium-vitamin D3-vitamin K (VIACTIV) 500-500-40 mg-unit-mcg Chew per chewable tablet Chew 1 tablet 2 (two) times a day.       cetirizine (ZYRTEC) 10 MG tablet Take 10 mg by mouth daily.       cholecalciferol, vitamin D3, (VITAMIN D3) 1,000 unit capsule Take 1,000 Units by mouth daily.       cranberry fruit (CRANBERRY) 475 mg cap Take 2 capsules by mouth daily.       cyanocobalamin, vitamin B-12, (VITAMIN B-12) 2,500 mcg Subl Place  1 tablet under the tongue daily.       cyclobenzaprine (FLEXERIL) 10 MG tablet Take 10 mg by mouth 3 (three) times a day as needed for muscle spasms.       DENOSUMAB (PROLIA SUBQ) Inject 60 mg under the skin every 6 (six) months.       diclofenac sodium (VOLTAREN) 1 % Gel Apply approximately 1/2-1 gm over affected hand joints tid-qid, as needed. 1 Tube 1     docusate sodium (STOOL SOFTENER) 100 MG capsule Take 200 mg by mouth bedtime.       DULoxetine (CYMBALTA) 30 MG capsule TAKE ONE CAPSULE DAILY WITH 60MG CAPSULE  3     DULoxetine (CYMBALTA) 60 MG capsule Take 90 mg by mouth bedtime.        EPINEPHrine (EPIPEN) 0.3 mg/0.3 mL (1:1,000) atIn Inject into the shoulder, thigh, or buttocks once.       fluticasone (FLONASE) 50 mcg/actuation nasal spray 1 spray into each nostril as needed.              folic acid (FOLVITE) 1 MG tablet Take 3 tablets (3,000 mcg total) by mouth daily. 270 tablet 0     hydroCHLOROthiazide (HYDRODIURIL) 12.5 MG tablet Take 12.5 mg by mouth daily.       lansoprazole (PREVACID) 15 MG capsule Take 15 mg by mouth daily.       levothyroxine (SYNTHROID, LEVOTHROID) 50 MCG tablet Take 50 mcg by mouth daily.       lidocaine (LIDODERM) 5 % Apply 1/2 to 1 patch over low back regions prn,  for up to 12 hrs. Then wait 12 hrs prior to applying another patch. 30 patch 2     lidocaine (XYLOCAINE) 5 % ointment Apply topically as needed.       magnesium oxide 250 mg Tab Take 250 mg by mouth 2 (two) times a day.       Medical Cannabis Use 1 Units As Directed. Take as instructed by the medical cannabis dispensary. The provider who enrolled the patient in the medical cannabis registry and certified this  patient will maintain ownership and liability of all provider reporting and registration requirements.       melatonin 5 mg Tab tablet Take 5 mg by mouth at bedtime.       methotrexate 25 mg/mL injection Inject 0.8 mL (20 mg total) under the skin every 7 days. 4 mL 0     mirabegron (MYRBETRIQ) 25 mg Tb24 ER  "tablet Take 25 mg by mouth daily.        MULTIVITAMIN ORAL Take 1 tablet by mouth 2 times a day at 6:00 am and 4:00 pm. Flinstone chewables       pantoprazole (PROTONIX) 20 MG tablet Take 20 mg by mouth daily.       peg 400-propylene glycol (SYSTANE) 0.4-0.3 % Drop Administer 1 drop to both eyes 4 (four) times a day as needed.       polyethylene glycol (MIRALAX) 17 gram packet Take 17 g by mouth daily as needed.       sulfamethoxazole-trimethoprim (SEPTRA DS) 800-160 mg per tablet Take 1 tablet by mouth 2 (two) times a day Prn after intercourse.             syringe with needle (BD TUBERCULIN SYRINGE) 1 mL 27 x 1/2\" Syrg USE 1 SYRINGE AS DIRECTED ONCE A WEEK. 12 Syringe 0     syringe, disposable, 1 mL Syrg Please provide 1 mL, 27-gauge syringes for injectable methotrexate use. 50 Syringe 0     traMADol (ULTRAM) 50 mg tablet Take 1 tablet p.o.bid-three times a day, prn for moderate to severe pain. 75 tablet 2     trospium (SANCTURA) 20 mg tablet Take 20 mg by mouth 2 (two) times a day.       UNABLE TO FIND 2 tablets daily. Med Name:       vit C/E/Zn/coppr/lutein/zeaxan (PRESERVISION AREDS-2 ORAL) Take by mouth.       vitamin E 400 UNIT capsule Take 400 Units by mouth 2 (two) times a day.       Current Facility-Administered Medications   Medication Dose Route Frequency Provider Last Rate Last Admin     hyaluronate Syrg 48 mg  6 mL Intra-articular Once Quinten Cerna DO         hyaluronate Syrg 48 mg  48 mg Intra-articular Once Quinten Cerna DO               Physical Exam:  Following up today via phone visit, per Covid-19 pandemic requirements.     Verbal consent has been obtained for this service by a care team member.     Phone call start time: 4:55 PM     Phone call end time: 5:13 PM     Phone number utilized: [phone]        Summary/Assessment:    History that includes seronegative rheumatoid arthritis, osteoarthritis, osteoporosis, fibromyalgia, gastric bypass, chronic low back " pains.    Presents for follow-up visit.      States her RA related joint pains have been stable with Orencia every 4 weeks and injectable methotrexate 0.8 mL weekly..      Mainly bothered by right greater than left knee pain.  States viscous fluid supplement injection into the right knee performed February 2021 only helped for about 3 weeks (in past helped for about 3 months).  Cortisone injection prior only helped for about a week.  Is interested in seeing orthopedics.    Latest monitoring labs noted to be stable.    Please see below for management plan.      From prior note: For chronic low back pains.  Takes Tylenol and Flexeril (from another provider) twice daily with some benefit. Back brace also beneficial.  History low back surgery.     Patient due for repeat labs in about 2 weeks..       Pertinent rheumatology/past medical history (please refer to above for more detailed history):      Seronegative rheumatoid arthritis    High-risk medication use    Osteoarthritis    Fibromyalgia    History gastric bypass    Osteoporosis    Chronic low back pain/status post surgical fusion L4-L5 (history of spinal stenosis).    Recurrent oral ulcers (folic acid 3 mg daily helpful)      Rheumatology medications provided/suggested:    Methotrexate injectable   Folic acid  Orencia infusion  Synvisc 1      Pertinent medication from other providers or from otc (please refer to above for more detailed med list):    Flexeril  Cymbalta  Prolia  HCTZ   Vitamin D      Pertinent medications already tried:     Eufflexa (not helpful)  Medical marijuana  Tramadol    Pertinent lab history:      Noted to have negative/unremarkable: Rheumatoid factor, CCP antibody, LEONEL.      Pertinent imaging/test history:        Other:    Has a standing order for labs every 3 months good through December 2019.    On prior visit, suggested looking into obtaining a paraffin wax machine.         Plan:        Continue injectable methotrexate 0.8 mL weekly.   "Already made aware to hold dose if experiencing infection, can restart thereafter.    Continue folic acid 3 mg daily, except the day when taking methotrexate.  Higher dosing due to oral ulcers.    Continue Orencia every 4 weeks.    Continue Lidoderm patches for low back pains (\"already seen spine clinic\")    Continue Voltaren gel for hand pains.    We will avoid oral NSAIDs given history of gastric bypass.    On Prolia for another provider for osteoporosis.    On Cymbalta from another provider for fibromyalgia.    Will refer to orthopedics for knee pains, right greater than left.    Recheck labs in about 10-11 weeks  (can associate/time with infusions).    Follow-up in 4 months.      Total time spent 22 minutes involved with patient care, includes placing orders, reviewing records and and formulating management plan.    This note was transcribed using Dragon voice recognition software as a result unintentional grammatical errors or word substitutions may have occurred. Please contact our Rheumatology department if you need any clarification or if you have any related inquiries.        Quinten Cerna DO  ...................  6/2/2021   1:14 PM      "

## 2021-06-26 ENCOUNTER — HEALTH MAINTENANCE LETTER (OUTPATIENT)
Age: 71
End: 2021-06-26

## 2021-06-26 NOTE — PATIENT INSTRUCTIONS - HE
Summary of Your Rheumatology Visit    Next Appointment:  4 Months    Medications:    Continue medications provided, as discussed.    Referrals:    Orthopedics (for knee's)    Tests:      Please have labs performed in about 10-11 weeks (can associate/time with Orencia infusions).      Injections:      Other:

## 2021-07-03 NOTE — ADDENDUM NOTE
Addendum Note by Oksana Carrillo RN at 7/3/2019  4:00 PM     Author: Oksana Carrillo RN Service: -- Author Type: Registered Nurse    Filed: 8/22/2019  4:23 PM Encounter Date: 7/3/2019 Status: Signed    : Oksana Carrillo RN (Registered Nurse)    Addended by: OKSANA CARRILLO on: 8/22/2019 04:23 PM        Modules accepted: Orders

## 2021-07-03 NOTE — ADDENDUM NOTE
Addendum Note by Oc Davis DO at 7/3/2019  4:00 PM     Author: Oc Davis DO Service: -- Author Type: Physician    Filed: 7/15/2019  7:15 PM Encounter Date: 7/3/2019 Status: Signed    : Oc Davis DO (Physician)    Addended by: OC DAVIS on: 7/15/2019 07:15 PM        Modules accepted: Orders

## 2021-07-03 NOTE — ADDENDUM NOTE
Addendum Note by Vinayak Walsh at 11/15/2017  7:30 AM     Author: Vinayak Walsh Service: -- Author Type:     Filed: 11/15/2017  7:30 AM Encounter Date: 11/8/2017 Status: Signed    : Vinayak Walsh ()    Addended by: VINAYAK WALSH on: 11/15/2017 07:30 AM        Modules accepted: Orders

## 2021-07-03 NOTE — ADDENDUM NOTE
Addendum Note by Sierra Beal CMA at 9/22/2017 12:02 PM     Author: Sierra Beal CMA Service: -- Author Type: Certified Medical Assistant    Filed: 9/22/2017 12:02 PM Date of Service: 9/22/2017 12:02 PM Status: Signed    : Sierra Beal CMA (Certified Medical Assistant)    Encounter addended by: Sierra Beal CMA on: 9/22/2017 12:02 PM<BR>     Actions taken: Visit Navigator Flowsheet section accepted

## 2021-07-03 NOTE — ADDENDUM NOTE
Addendum Note by Oc Davis DO at 7/3/2019  4:00 PM     Author: Oc Davis DO Service: -- Author Type: Physician    Filed: 7/24/2019 11:13 AM Encounter Date: 7/3/2019 Status: Signed    : Oc Davis DO (Physician)    Addended by: OC DAVIS on: 7/24/2019 11:13 AM        Modules accepted: Orders

## 2021-07-03 NOTE — ADDENDUM NOTE
Addendum Note by Oc Davis DO at 7/3/2019  4:00 PM     Author: Oc Davis DO Service: -- Author Type: Physician    Filed: 7/10/2019  2:09 PM Encounter Date: 7/3/2019 Status: Signed    : Oc Davis DO (Physician)    Addended by: OC DAVIS on: 7/10/2019 02:09 PM        Modules accepted: Orders

## 2021-07-03 NOTE — ADDENDUM NOTE
Addendum Note by Oksana Carrillo RN at 7/3/2019  4:00 PM     Author: Oksana Carrillo RN Service: -- Author Type: Registered Nurse    Filed: 8/22/2019 12:41 PM Encounter Date: 7/3/2019 Status: Signed    : Oksana Carrillo RN (Registered Nurse)    Addended by: OKSANA CARRILLO on: 8/22/2019 12:41 PM        Modules accepted: Orders

## 2021-07-06 VITALS
OXYGEN SATURATION: 95 % | TEMPERATURE: 98.5 F | DIASTOLIC BLOOD PRESSURE: 76 MMHG | HEART RATE: 90 BPM | SYSTOLIC BLOOD PRESSURE: 157 MMHG

## 2021-07-13 ENCOUNTER — RECORDS - HEALTHEAST (OUTPATIENT)
Dept: ADMINISTRATIVE | Facility: CLINIC | Age: 71
End: 2021-07-13

## 2021-07-15 ENCOUNTER — TELEPHONE (OUTPATIENT)
Dept: RHEUMATOLOGY | Facility: CLINIC | Age: 71
End: 2021-07-15

## 2021-07-15 DIAGNOSIS — M06.00 SERONEGATIVE RHEUMATOID ARTHRITIS (H): Primary | ICD-10-CM

## 2021-07-16 RX ORDER — METHOTREXATE 25 MG/ML
20 INJECTION, SOLUTION INTRA-ARTERIAL; INTRAMUSCULAR; INTRAVENOUS
Qty: 4 ML | Refills: 1 | Status: SHIPPED | OUTPATIENT
Start: 2021-07-16 | End: 2021-08-26

## 2021-07-21 ENCOUNTER — RECORDS - HEALTHEAST (OUTPATIENT)
Dept: ADMINISTRATIVE | Facility: CLINIC | Age: 71
End: 2021-07-21

## 2021-07-21 ENCOUNTER — INFUSION THERAPY VISIT (OUTPATIENT)
Dept: INFUSION THERAPY | Facility: CLINIC | Age: 71
End: 2021-07-21
Payer: MEDICARE

## 2021-07-21 VITALS
BODY MASS INDEX: 42.67 KG/M2 | DIASTOLIC BLOOD PRESSURE: 79 MMHG | HEIGHT: 61 IN | RESPIRATION RATE: 16 BRPM | SYSTOLIC BLOOD PRESSURE: 122 MMHG | TEMPERATURE: 98.5 F | OXYGEN SATURATION: 98 % | HEART RATE: 89 BPM | WEIGHT: 226 LBS

## 2021-07-21 DIAGNOSIS — M06.09 SERONEGATIVE RHEUMATOID ARTHRITIS OF MULTIPLE SITES (H): Primary | ICD-10-CM

## 2021-07-21 LAB
ALBUMIN SERPL-MCNC: 3.3 G/DL (ref 3.5–5)
ALT SERPL W P-5'-P-CCNC: 25 U/L (ref 0–45)
AST SERPL W P-5'-P-CCNC: 17 U/L (ref 0–40)
BASOPHILS # BLD AUTO: 0 10E3/UL (ref 0–0.2)
BASOPHILS NFR BLD AUTO: 0 %
CREAT SERPL-MCNC: 0.8 MG/DL (ref 0.6–1.1)
EOSINOPHIL # BLD AUTO: 0.2 10E3/UL (ref 0–0.7)
EOSINOPHIL NFR BLD AUTO: 2 %
ERYTHROCYTE [DISTWIDTH] IN BLOOD BY AUTOMATED COUNT: 14.1 % (ref 10–15)
GFR SERPL CREATININE-BSD FRML MDRD: 75 ML/MIN/1.73M2
HCT VFR BLD AUTO: 41.9 % (ref 35–47)
HGB BLD-MCNC: 13.6 G/DL (ref 11.7–15.7)
IMM GRANULOCYTES # BLD: 0 10E3/UL
IMM GRANULOCYTES NFR BLD: 0 %
LYMPHOCYTES # BLD AUTO: 3.5 10E3/UL (ref 0.8–5.3)
LYMPHOCYTES NFR BLD AUTO: 39 %
MCH RBC QN AUTO: 30.2 PG (ref 26.5–33)
MCHC RBC AUTO-ENTMCNC: 32.5 G/DL (ref 31.5–36.5)
MCV RBC AUTO: 93 FL (ref 78–100)
MONOCYTES # BLD AUTO: 0.9 10E3/UL (ref 0–1.3)
MONOCYTES NFR BLD AUTO: 10 %
NEUTROPHILS # BLD AUTO: 4.4 10E3/UL (ref 1.6–8.3)
NEUTROPHILS NFR BLD AUTO: 49 %
NRBC # BLD AUTO: 0 10E3/UL
NRBC BLD AUTO-RTO: 0 /100
PLATELET # BLD AUTO: 316 10E3/UL (ref 150–450)
RBC # BLD AUTO: 4.5 10E6/UL (ref 3.8–5.2)
WBC # BLD AUTO: 9.1 10E3/UL (ref 4–11)

## 2021-07-21 PROCEDURE — 84460 ALANINE AMINO (ALT) (SGPT): CPT

## 2021-07-21 PROCEDURE — 82040 ASSAY OF SERUM ALBUMIN: CPT

## 2021-07-21 PROCEDURE — 250N000028 HC RX JA MOD (INTRAVENOUS), IP 250 OP 636: Mod: JA | Performed by: INTERNAL MEDICINE

## 2021-07-21 PROCEDURE — 36415 COLL VENOUS BLD VENIPUNCTURE: CPT

## 2021-07-21 PROCEDURE — 96365 THER/PROPH/DIAG IV INF INIT: CPT

## 2021-07-21 PROCEDURE — 82565 ASSAY OF CREATININE: CPT

## 2021-07-21 PROCEDURE — 258N000003 HC RX IP 258 OP 636: Performed by: INTERNAL MEDICINE

## 2021-07-21 PROCEDURE — 85025 COMPLETE CBC W/AUTO DIFF WBC: CPT

## 2021-07-21 PROCEDURE — 84450 TRANSFERASE (AST) (SGOT): CPT

## 2021-07-21 RX ORDER — ALBUTEROL SULFATE 0.83 MG/ML
2.5 SOLUTION RESPIRATORY (INHALATION)
Status: DISCONTINUED | OUTPATIENT
Start: 2021-07-21 | End: 2021-07-21 | Stop reason: HOSPADM

## 2021-07-21 RX ORDER — ACETAMINOPHEN 325 MG/1
650 TABLET ORAL ONCE
Status: CANCELLED
Start: 2021-08-18 | End: 2021-08-18

## 2021-07-21 RX ORDER — METHYLPREDNISOLONE SODIUM SUCCINATE 125 MG/2ML
125 INJECTION, POWDER, LYOPHILIZED, FOR SOLUTION INTRAMUSCULAR; INTRAVENOUS
Status: CANCELLED
Start: 2021-08-18

## 2021-07-21 RX ORDER — ALBUTEROL SULFATE 90 UG/1
1-2 AEROSOL, METERED RESPIRATORY (INHALATION)
Status: DISCONTINUED | OUTPATIENT
Start: 2021-07-21 | End: 2021-07-21 | Stop reason: HOSPADM

## 2021-07-21 RX ORDER — EPINEPHRINE 1 MG/ML
0.3 INJECTION, SOLUTION INTRAMUSCULAR; SUBCUTANEOUS EVERY 5 MIN PRN
Status: CANCELLED | OUTPATIENT
Start: 2021-08-18

## 2021-07-21 RX ORDER — ALBUTEROL SULFATE 90 UG/1
1-2 AEROSOL, METERED RESPIRATORY (INHALATION)
Status: CANCELLED
Start: 2021-08-18

## 2021-07-21 RX ORDER — METHYLPREDNISOLONE SODIUM SUCCINATE 125 MG/2ML
125 INJECTION, POWDER, LYOPHILIZED, FOR SOLUTION INTRAMUSCULAR; INTRAVENOUS
Status: DISCONTINUED | OUTPATIENT
Start: 2021-07-21 | End: 2021-07-21 | Stop reason: HOSPADM

## 2021-07-21 RX ORDER — HEPARIN SODIUM,PORCINE 10 UNIT/ML
5 VIAL (ML) INTRAVENOUS
Status: CANCELLED | OUTPATIENT
Start: 2021-08-18

## 2021-07-21 RX ORDER — NALOXONE HYDROCHLORIDE 0.4 MG/ML
0.2 INJECTION, SOLUTION INTRAMUSCULAR; INTRAVENOUS; SUBCUTANEOUS
Status: CANCELLED | OUTPATIENT
Start: 2021-08-18

## 2021-07-21 RX ORDER — NALOXONE HYDROCHLORIDE 0.4 MG/ML
0.2 INJECTION, SOLUTION INTRAMUSCULAR; INTRAVENOUS; SUBCUTANEOUS
Status: DISCONTINUED | OUTPATIENT
Start: 2021-07-21 | End: 2021-07-21 | Stop reason: HOSPADM

## 2021-07-21 RX ORDER — MEPERIDINE HYDROCHLORIDE 50 MG/ML
25 INJECTION INTRAMUSCULAR; INTRAVENOUS; SUBCUTANEOUS EVERY 30 MIN PRN
Status: CANCELLED | OUTPATIENT
Start: 2021-08-18

## 2021-07-21 RX ORDER — DIPHENHYDRAMINE HYDROCHLORIDE 50 MG/ML
50 INJECTION INTRAMUSCULAR; INTRAVENOUS
Status: CANCELLED
Start: 2021-08-18

## 2021-07-21 RX ORDER — EPINEPHRINE 1 MG/ML
0.3 INJECTION, SOLUTION INTRAMUSCULAR; SUBCUTANEOUS EVERY 5 MIN PRN
Status: DISCONTINUED | OUTPATIENT
Start: 2021-07-21 | End: 2021-07-21 | Stop reason: HOSPADM

## 2021-07-21 RX ORDER — DIPHENHYDRAMINE HYDROCHLORIDE 50 MG/ML
50 INJECTION INTRAMUSCULAR; INTRAVENOUS
Status: DISCONTINUED | OUTPATIENT
Start: 2021-07-21 | End: 2021-07-21 | Stop reason: HOSPADM

## 2021-07-21 RX ORDER — MEPERIDINE HYDROCHLORIDE 50 MG/ML
25 INJECTION INTRAMUSCULAR; INTRAVENOUS; SUBCUTANEOUS EVERY 30 MIN PRN
Status: DISCONTINUED | OUTPATIENT
Start: 2021-07-21 | End: 2021-07-21 | Stop reason: HOSPADM

## 2021-07-21 RX ORDER — HEPARIN SODIUM (PORCINE) LOCK FLUSH IV SOLN 100 UNIT/ML 100 UNIT/ML
5 SOLUTION INTRAVENOUS
Status: CANCELLED | OUTPATIENT
Start: 2021-08-18

## 2021-07-21 RX ORDER — ALBUTEROL SULFATE 0.83 MG/ML
2.5 SOLUTION RESPIRATORY (INHALATION)
Status: CANCELLED | OUTPATIENT
Start: 2021-08-18

## 2021-07-21 RX ADMIN — SODIUM CHLORIDE 1000 MG: 9 INJECTION, SOLUTION INTRAVENOUS at 10:51

## 2021-07-21 ASSESSMENT — MIFFLIN-ST. JEOR: SCORE: 1482.51

## 2021-07-21 NOTE — PROGRESS NOTES
Infusion Nursing Note:  Marlinruben Alejo presents today for orencia infusion.    Patient seen by provider today: No   present during visit today: Not Applicable.    Note: N/A.      Intravenous Access:  Peripheral IV placed.    Treatment Conditions:  Not Applicable.      Post Infusion Assessment:  Patient tolerated infusion without incident.       Discharge Plan:   Patient discharged in stable condition accompanied by: self.      Susanne Rogers RN

## 2021-07-23 ENCOUNTER — RECORDS - HEALTHEAST (OUTPATIENT)
Dept: SCHEDULING | Facility: CLINIC | Age: 71
End: 2021-07-23

## 2021-07-23 DIAGNOSIS — N64.59 OTHER SIGN AND SYMPTOM IN BREAST: ICD-10-CM

## 2021-08-20 ENCOUNTER — INFUSION THERAPY VISIT (OUTPATIENT)
Dept: INFUSION THERAPY | Facility: CLINIC | Age: 71
End: 2021-08-20
Attending: FAMILY MEDICINE
Payer: MEDICARE

## 2021-08-20 VITALS
TEMPERATURE: 97.8 F | RESPIRATION RATE: 18 BRPM | BODY MASS INDEX: 43.32 KG/M2 | OXYGEN SATURATION: 96 % | WEIGHT: 229.28 LBS | HEART RATE: 82 BPM

## 2021-08-20 DIAGNOSIS — M06.09 SERONEGATIVE RHEUMATOID ARTHRITIS OF MULTIPLE SITES (H): Primary | ICD-10-CM

## 2021-08-20 PROCEDURE — 258N000003 HC RX IP 258 OP 636: Performed by: INTERNAL MEDICINE

## 2021-08-20 PROCEDURE — 250N000028 HC RX JA MOD (INTRAVENOUS), IP 250 OP 636: Mod: JA | Performed by: INTERNAL MEDICINE

## 2021-08-20 PROCEDURE — 96365 THER/PROPH/DIAG IV INF INIT: CPT

## 2021-08-20 RX ORDER — HEPARIN SODIUM (PORCINE) LOCK FLUSH IV SOLN 100 UNIT/ML 100 UNIT/ML
5 SOLUTION INTRAVENOUS
Status: CANCELLED | OUTPATIENT
Start: 2021-09-15

## 2021-08-20 RX ORDER — MEPERIDINE HYDROCHLORIDE 50 MG/ML
25 INJECTION INTRAMUSCULAR; INTRAVENOUS; SUBCUTANEOUS EVERY 30 MIN PRN
Status: CANCELLED | OUTPATIENT
Start: 2021-09-15

## 2021-08-20 RX ORDER — METHYLPREDNISOLONE SODIUM SUCCINATE 125 MG/2ML
125 INJECTION, POWDER, LYOPHILIZED, FOR SOLUTION INTRAMUSCULAR; INTRAVENOUS
Status: CANCELLED
Start: 2021-09-15

## 2021-08-20 RX ORDER — DIPHENHYDRAMINE HYDROCHLORIDE 50 MG/ML
50 INJECTION INTRAMUSCULAR; INTRAVENOUS
Status: CANCELLED
Start: 2021-09-15

## 2021-08-20 RX ORDER — ALBUTEROL SULFATE 90 UG/1
1-2 AEROSOL, METERED RESPIRATORY (INHALATION)
Status: CANCELLED
Start: 2021-09-15

## 2021-08-20 RX ORDER — ACETAMINOPHEN 325 MG/1
650 TABLET ORAL ONCE
Status: CANCELLED
Start: 2021-09-15 | End: 2021-09-15

## 2021-08-20 RX ORDER — ALBUTEROL SULFATE 0.83 MG/ML
2.5 SOLUTION RESPIRATORY (INHALATION)
Status: CANCELLED | OUTPATIENT
Start: 2021-09-15

## 2021-08-20 RX ORDER — EPINEPHRINE 1 MG/ML
0.3 INJECTION, SOLUTION INTRAMUSCULAR; SUBCUTANEOUS EVERY 5 MIN PRN
Status: CANCELLED | OUTPATIENT
Start: 2021-09-15

## 2021-08-20 RX ORDER — HEPARIN SODIUM,PORCINE 10 UNIT/ML
5 VIAL (ML) INTRAVENOUS
Status: CANCELLED | OUTPATIENT
Start: 2021-09-15

## 2021-08-20 RX ORDER — NALOXONE HYDROCHLORIDE 0.4 MG/ML
0.2 INJECTION, SOLUTION INTRAMUSCULAR; INTRAVENOUS; SUBCUTANEOUS
Status: CANCELLED | OUTPATIENT
Start: 2021-09-15

## 2021-08-20 RX ADMIN — SODIUM CHLORIDE 1000 MG: 9 INJECTION, SOLUTION INTRAVENOUS at 11:00

## 2021-08-20 NOTE — PROGRESS NOTES
~~~ NOTE: If the patient answers yes to any of the questions below, hold the infusion and contact ordering provider or on-call provider.    1. Have you recently had an elevated temperature, fever, chills, productive cough, coughing for 3 weeks or longer or hemoptysis,  abnormal vital signs, night sweats,  chest pain or have you noticed a decrease in your appetite, unexplained weight loss or fatigue? No  2. Do you have any open wounds or new incisions? No  3. Do you have any recent or upcoming hospitalizations, surgeries or dental procedures? No  4. Do you currently have or recently have had any signs of illness or infection or are you on any antibiotics? No  5. Have you had any new, sudden or worsening abdominal pain? No  6. Have you or anyone in your household received a live vaccination in the past 4 weeks? Please note:  No live vaccines while on biologic/chemotherapy until 6 months after the last treatment.  Patient can receive the flu vaccine (shot only) and the pneumovax.  It is optimal for the patient to get these vaccines mid cycle, but they can be given at any time as long as it is not on the day of the infusion. No  7. Have you recently been diagnosed with any new nervous system diseases (ie. Multiple sclerosis, Guillain Broseley, seizures, neurological changes) or cancer diagnosis? Are you on any form of radiation or chemotherapy? No  8. Are you pregnant or breast feeding or do you have plans of pregnancy in the future? No  9. Have you been having any signs of worsening depression or suicidal ideations?  (benlysta only) No  10. Have there been any other new onset medical symptoms? No      Infusion Nursing Note:  Marlin Alejo presents today for Orencia.    Patient seen by provider today: No   present during visit today: Not Applicable.    Note: N/A.      Intravenous Access:  Peripheral IV placed per HELLEN Bloom.    Treatment Conditions:  Biological infusion checklist completed. Proceed with tx as  ordered.      Post Infusion Assessment:  Patient tolerated infusion without incident.  Blood return noted pre and post infusion.  Site patent and intact, free from redness, edema or discomfort.  Access discontinued per protocol.       Discharge Plan:   Patient discharged in stable condition accompanied by: family.  Departure Mode: Ambulatory with use of walker.      Sarai LAWSON RN

## 2021-08-23 DIAGNOSIS — M06.00 SERONEGATIVE RHEUMATOID ARTHRITIS (H): ICD-10-CM

## 2021-08-23 NOTE — TELEPHONE ENCOUNTER
Refill request from [id number]    Medication: Methotrexate 50mg/2ML  Last Refill: 7/22/21  Last OV: 6/2/21  Last lab: 7/21/21  Future OV: 10/5/21

## 2021-08-26 RX ORDER — METHOTREXATE 25 MG/ML
20 INJECTION, SOLUTION INTRA-ARTERIAL; INTRAMUSCULAR; INTRAVENOUS
Qty: 4 ML | Refills: 1 | Status: SHIPPED | OUTPATIENT
Start: 2021-08-26 | End: 2021-09-14

## 2021-08-30 ENCOUNTER — LAB REQUISITION (OUTPATIENT)
Dept: LAB | Facility: CLINIC | Age: 71
End: 2021-08-30
Payer: MEDICARE

## 2021-08-30 DIAGNOSIS — I10 ESSENTIAL (PRIMARY) HYPERTENSION: ICD-10-CM

## 2021-08-30 DIAGNOSIS — E83.42 HYPOMAGNESEMIA: ICD-10-CM

## 2021-08-30 LAB
ANION GAP SERPL CALCULATED.3IONS-SCNC: 12 MMOL/L (ref 5–18)
BUN SERPL-MCNC: 20 MG/DL (ref 8–28)
CALCIUM SERPL-MCNC: 9.8 MG/DL (ref 8.5–10.5)
CHLORIDE BLD-SCNC: 104 MMOL/L (ref 98–107)
CO2 SERPL-SCNC: 26 MMOL/L (ref 22–31)
CREAT SERPL-MCNC: 0.71 MG/DL (ref 0.6–1.1)
GFR SERPL CREATININE-BSD FRML MDRD: 87 ML/MIN/1.73M2
GLUCOSE BLD-MCNC: 105 MG/DL (ref 70–125)
MAGNESIUM SERPL-MCNC: 1.7 MG/DL (ref 1.8–2.6)
POTASSIUM BLD-SCNC: 3.9 MMOL/L (ref 3.5–5)
SODIUM SERPL-SCNC: 142 MMOL/L (ref 136–145)

## 2021-08-30 PROCEDURE — 83735 ASSAY OF MAGNESIUM: CPT | Mod: ORL | Performed by: FAMILY MEDICINE

## 2021-08-30 PROCEDURE — 80048 BASIC METABOLIC PNL TOTAL CA: CPT | Mod: ORL | Performed by: FAMILY MEDICINE

## 2021-08-30 PROCEDURE — 36415 COLL VENOUS BLD VENIPUNCTURE: CPT | Mod: ORL | Performed by: FAMILY MEDICINE

## 2021-09-13 DIAGNOSIS — M06.00 SERONEGATIVE RHEUMATOID ARTHRITIS (H): ICD-10-CM

## 2021-09-13 NOTE — TELEPHONE ENCOUNTER
Refill request from [id number]    Medication: Methotrexate 50mg/2ML   Last Refill: 8/16/21  Last OV: 6/2/21  Last lab: 7/21/21  Future OV: 10/5/21

## 2021-09-14 RX ORDER — METHOTREXATE 25 MG/ML
20 INJECTION, SOLUTION INTRA-ARTERIAL; INTRAMUSCULAR; INTRAVENOUS
Qty: 4 ML | Refills: 1 | Status: SHIPPED | OUTPATIENT
Start: 2021-09-14

## 2021-09-16 ENCOUNTER — HOSPITAL ENCOUNTER (OUTPATIENT)
Dept: MAMMOGRAPHY | Facility: CLINIC | Age: 71
Discharge: HOME | End: 2021-09-16
Attending: FAMILY MEDICINE | Admitting: FAMILY MEDICINE
Payer: MEDICARE

## 2021-09-16 DIAGNOSIS — M06.00 SERONEGATIVE RHEUMATOID ARTHRITIS (H): Primary | ICD-10-CM

## 2021-09-16 DIAGNOSIS — Z12.31 VISIT FOR SCREENING MAMMOGRAM: ICD-10-CM

## 2021-09-16 PROCEDURE — 77067 SCR MAMMO BI INCL CAD: CPT

## 2021-09-16 RX ORDER — NEEDLES, SAFETY 18GX1 1/2"
NEEDLE, DISPOSABLE MISCELLANEOUS
Qty: 12 EACH | Refills: 1 | Status: SHIPPED | OUTPATIENT
Start: 2021-09-16

## 2021-09-20 ENCOUNTER — INFUSION THERAPY VISIT (OUTPATIENT)
Dept: INFUSION THERAPY | Facility: CLINIC | Age: 71
End: 2021-09-20
Payer: MEDICARE

## 2021-09-20 VITALS
HEART RATE: 85 BPM | SYSTOLIC BLOOD PRESSURE: 119 MMHG | TEMPERATURE: 98.6 F | WEIGHT: 229 LBS | RESPIRATION RATE: 18 BRPM | BODY MASS INDEX: 43.27 KG/M2 | OXYGEN SATURATION: 94 % | DIASTOLIC BLOOD PRESSURE: 78 MMHG

## 2021-09-20 DIAGNOSIS — M06.09 SERONEGATIVE RHEUMATOID ARTHRITIS OF MULTIPLE SITES (H): Primary | ICD-10-CM

## 2021-09-20 PROCEDURE — 250N000028 HC RX JA MOD (INTRAVENOUS), IP 250 OP 636: Mod: JA | Performed by: INTERNAL MEDICINE

## 2021-09-20 PROCEDURE — 258N000003 HC RX IP 258 OP 636: Performed by: INTERNAL MEDICINE

## 2021-09-20 PROCEDURE — 96365 THER/PROPH/DIAG IV INF INIT: CPT

## 2021-09-20 RX ORDER — MEPERIDINE HYDROCHLORIDE 50 MG/ML
25 INJECTION INTRAMUSCULAR; INTRAVENOUS; SUBCUTANEOUS EVERY 30 MIN PRN
Status: CANCELLED | OUTPATIENT
Start: 2021-10-13

## 2021-09-20 RX ORDER — ACETAMINOPHEN 325 MG/1
650 TABLET ORAL ONCE
Status: CANCELLED
Start: 2021-10-13 | End: 2021-10-13

## 2021-09-20 RX ORDER — NALOXONE HYDROCHLORIDE 0.4 MG/ML
0.2 INJECTION, SOLUTION INTRAMUSCULAR; INTRAVENOUS; SUBCUTANEOUS
Status: CANCELLED | OUTPATIENT
Start: 2021-10-13

## 2021-09-20 RX ORDER — HEPARIN SODIUM,PORCINE 10 UNIT/ML
5 VIAL (ML) INTRAVENOUS
Status: CANCELLED | OUTPATIENT
Start: 2021-10-13

## 2021-09-20 RX ORDER — HEPARIN SODIUM (PORCINE) LOCK FLUSH IV SOLN 100 UNIT/ML 100 UNIT/ML
5 SOLUTION INTRAVENOUS
Status: CANCELLED | OUTPATIENT
Start: 2021-10-13

## 2021-09-20 RX ORDER — DIPHENHYDRAMINE HYDROCHLORIDE 50 MG/ML
50 INJECTION INTRAMUSCULAR; INTRAVENOUS
Status: CANCELLED
Start: 2021-10-13

## 2021-09-20 RX ORDER — ALBUTEROL SULFATE 0.83 MG/ML
2.5 SOLUTION RESPIRATORY (INHALATION)
Status: CANCELLED | OUTPATIENT
Start: 2021-10-13

## 2021-09-20 RX ORDER — EPINEPHRINE 1 MG/ML
0.3 INJECTION, SOLUTION INTRAMUSCULAR; SUBCUTANEOUS EVERY 5 MIN PRN
Status: CANCELLED | OUTPATIENT
Start: 2021-10-13

## 2021-09-20 RX ORDER — ALBUTEROL SULFATE 90 UG/1
1-2 AEROSOL, METERED RESPIRATORY (INHALATION)
Status: CANCELLED
Start: 2021-10-13

## 2021-09-20 RX ORDER — METHYLPREDNISOLONE SODIUM SUCCINATE 125 MG/2ML
125 INJECTION, POWDER, LYOPHILIZED, FOR SOLUTION INTRAMUSCULAR; INTRAVENOUS
Status: CANCELLED
Start: 2021-10-13

## 2021-09-20 RX ADMIN — SODIUM CHLORIDE 1000 MG: 9 INJECTION, SOLUTION INTRAVENOUS at 12:10

## 2021-09-20 NOTE — PROGRESS NOTES
Pt here for orencia infusion. IV placed easily and no problems with infusion over 30 minutes. IV removed upon completion and pt kin using walker to lobby to meet her ride. Pt aware of treatment plan.

## 2021-09-29 ENCOUNTER — HOSPITAL ENCOUNTER (OUTPATIENT)
Dept: MAMMOGRAPHY | Facility: CLINIC | Age: 71
End: 2021-09-29
Attending: FAMILY MEDICINE
Payer: MEDICARE

## 2021-09-29 ENCOUNTER — HOSPITAL ENCOUNTER (OUTPATIENT)
Dept: ULTRASOUND IMAGING | Facility: CLINIC | Age: 71
End: 2021-09-29
Attending: FAMILY MEDICINE
Payer: MEDICARE

## 2021-09-29 DIAGNOSIS — N64.89 BREAST ASYMMETRY: ICD-10-CM

## 2021-09-29 PROCEDURE — 76642 ULTRASOUND BREAST LIMITED: CPT | Mod: RT

## 2021-09-29 PROCEDURE — 77061 BREAST TOMOSYNTHESIS UNI: CPT | Mod: RT

## 2021-10-05 ENCOUNTER — VIRTUAL VISIT (OUTPATIENT)
Dept: RHEUMATOLOGY | Facility: CLINIC | Age: 71
End: 2021-10-05
Payer: MEDICARE

## 2021-10-05 DIAGNOSIS — Z79.899 HIGH RISK MEDICATION USE: ICD-10-CM

## 2021-10-05 DIAGNOSIS — M17.11 OSTEOARTHRITIS OF RIGHT KNEE, UNSPECIFIED OSTEOARTHRITIS TYPE: ICD-10-CM

## 2021-10-05 DIAGNOSIS — M79.7 FIBROMYALGIA: ICD-10-CM

## 2021-10-05 DIAGNOSIS — M06.00 SERONEGATIVE RHEUMATOID ARTHRITIS (H): Primary | ICD-10-CM

## 2021-10-05 PROCEDURE — 99442 PR PHYSICIAN TELEPHONE EVALUATION 11-20 MIN: CPT | Mod: 95 | Performed by: INTERNAL MEDICINE

## 2021-10-05 RX ORDER — SULFAMETHOXAZOLE/TRIMETHOPRIM 800-160 MG
1 TABLET ORAL
COMMUNITY

## 2021-10-05 RX ORDER — MULTIVITAMIN/IRON/FOLIC ACID 18MG-0.4MG
TABLET ORAL
COMMUNITY

## 2021-10-05 RX ORDER — VITAMIN B COMPLEX
1 TABLET ORAL
COMMUNITY

## 2021-10-05 RX ORDER — PEDI MULTIVIT NO.7/FOLIC ACID 100 MCG
TABLET,CHEWABLE ORAL
COMMUNITY

## 2021-10-05 NOTE — PATIENT INSTRUCTIONS
Summary of Your Rheumatology Visit    Next Appointment:  4 Months (in-clinic)      Medications:    Please follow directives on pill bottle on how to take medication(s) provided.      Referrals:      Tests:       Please have labs performed in about 2-3 weeks (can have with Orencia infusion).    Injections:      Other:    Can try over-the-counter icy hot patches for low back pains.

## 2021-10-05 NOTE — PROGRESS NOTES
Marlin Alejo who presents today with a chief complaint of  No chief complaint on file.      Joint Pains: yes  Location: hands  Onset: couple weeks  Intensity:  6/10  AM Stiffness: 60 Minutes  Alleviating/Aggravating Factors: using hands  Tolerating Meds: yes  Other: tingling in the hands and feets      ROS:  Patient denies having any chest pain, shortness of breath, cough, abdominal pain, nausea, +vomiting, rashes, fevers, oral ulcers and recent infections.  Patient admits to having a good appetite      Problem List:  Patient Active Problem List   Diagnosis     Osteoporosis     Fibromyalgia     Morbid obesity (H)     Essential hypertension     Postoperative malabsorption     Frequent UTI     Primary osteoarthritis of both hands     Lumbar spondylosis     High risk medication use     Seronegative rheumatoid arthritis of multiple sites (H)     Chronic pain of right knee     Bilateral primary osteoarthritis of knee        PMH:   Past Medical History:   Diagnosis Date     Arthritis of back      Fibromyalgia, primary      Hand swelling      Osteoarthritis      Osteoporosis        Surgical History:  Past Surgical History:   Procedure Laterality Date     APPENDECTOMY  1972     BACK SURGERY       CHOLECYSTECTOMY  2005     CYSTOCELE REPAIR  2005     HYSTERECTOMY  1986    at age 36     IR LUMBAR EPIDURAL STEROID INJECTION  3/12/2004     LAMINECTOMY  2006    L3-4, L4-5     LAPAROTOMY EXPLORATORY  1982    Right partial salpingectomy     OOPHORECTOMY Bilateral 1986     OTHER SURGICAL HISTORY  2007    gastric [id number]     OTHER SURGICAL HISTORY      CERVICAL CIRCLAGES X3     OTHER SURGICAL HISTORY  2002    Right knee arthoscopy     OTHER SURGICAL HISTORY  2005    hemmorhoidectomy     PANNICULECTOMY  2009     POSTERIOR LAMINECTOMY / DECOMPRESSION LUMBAR SPINE  2009    L4-5     RECONSTRUCT ANKLE  1988     REPAIR RECTOCELE  2005       Family History:  Family History   Problem Relation Age of Onset     Heart Disease Mother       Colon Cancer Father 68.00     Arthritis Sister      Hypertension Sister      Coronary Artery Disease Brother      Heart Disease Brother      Breast Cancer Cousin 40.00     Cancer Other      Leukemia Other      Cerebrovascular Disease Other      Heart Disease Other      Hypertension Other      Hereditary Breast and Ovarian Cancer Syndrome No family hx of      Endometrial Cancer No family hx of      Ovarian Cancer No family hx of        Social History:   reports that she has never smoked. She has never used smokeless tobacco. She reports that she does not drink alcohol and does not use drugs.    Allergies:  Allergies   Allergen Reactions     Bee Sting Kit [Bee Venom] Unknown     swelling     Codeine Hives     Mental changes     External Allergen Needs Reconciliation - See Comment Unknown     UNABLE TO ASSESS, Red Pepper - throat swelling     Lemonade Flavor [Lemonade Oil Flavor] Unknown     Oral sores     Neurontin [Gabapentin] Dizziness     GI upset     Nortriptyline Swelling     Nsaids (Non-Steroidal Anti-Inflammatory Drug) [Nsaids] Unknown     S/p anastasia-en-y     Oxycodone Nausea and Vomiting     GENERIC (BRAND OKAY TO TAKE)     Strawberry Unknown     Oral sores     Talwin Compound [Pentazocine] Other (See Comments)     Talwin [Pentazocine] Nausea and Vomiting and Dizziness     Ultracet Dizziness     Ampicillin Rash        Current Medications:  Current Outpatient Medications   Medication Sig Dispense Refill     abatacept (ORENCIA, WITH MALTOSE,) 250 mg injection [ABATACEPT (ORENCIA, WITH MALTOSE,) 250 MG INJECTION] Infuse 1,000 mg into a venous catheter every 28 days. 1,000 mg 5     ACETAMINOPHEN (TYLENOL ARTHRITIS ORAL) [ACETAMINOPHEN (TYLENOL ARTHRITIS ORAL)] Take 650 mg by mouth daily as needed.       aspirin 81 MG EC tablet [ASPIRIN 81 MG EC TABLET] Take 40.5 mg by mouth 3 (three) times a week. 1/2 tab (40.5mg)       b complex vitamins tablet [B COMPLEX VITAMINS TABLET] Take 1 tablet by mouth 3 (three) times  a day.       calcium-vitamin D3-vitamin K (VIACTIV) 500-500-40 mg-unit-mcg Chew per chewable tablet [CALCIUM-VITAMIN D3-VITAMIN K (VIACTIV) 500-500-40 MG-UNIT-MCG CHEW PER CHEWABLE TABLET] Chew 1 tablet 2 (two) times a day.       cetirizine (ZYRTEC) 10 MG tablet [CETIRIZINE (ZYRTEC) 10 MG TABLET] Take 10 mg by mouth daily.       cholecalciferol, vitamin D3, (VITAMIN D3) 1,000 unit capsule [CHOLECALCIFEROL, VITAMIN D3, (VITAMIN D3) 1,000 UNIT CAPSULE] Take 1,000 Units by mouth daily.       cyanocobalamin, vitamin B-12, (VITAMIN B-12) 2,500 mcg Subl [CYANOCOBALAMIN, VITAMIN B-12, (VITAMIN B-12) 2,500 MCG SUBL] Place 1 tablet under the tongue daily.       cyclobenzaprine (FLEXERIL) 10 MG tablet [CYCLOBENZAPRINE (FLEXERIL) 10 MG TABLET] Take 10 mg by mouth 3 (three) times a day as needed for muscle spasms.       DENOSUMAB (PROLIA SUBQ) [DENOSUMAB (PROLIA SUBQ)] Inject 60 mg under the skin every 6 (six) months.       docusate sodium (STOOL SOFTENER) 100 MG capsule [DOCUSATE SODIUM (STOOL SOFTENER) 100 MG CAPSULE] Take 200 mg by mouth bedtime.       DULoxetine (CYMBALTA) 30 MG capsule [DULOXETINE (CYMBALTA) 30 MG CAPSULE] TAKE ONE CAPSULE DAILY WITH 60MG CAPSULE  3     DULoxetine (CYMBALTA) 60 MG capsule [DULOXETINE (CYMBALTA) 60 MG CAPSULE] Take 90 mg by mouth bedtime.        EPINEPHrine (EPIPEN) 0.3 mg/0.3 mL (1:1,000) atIn [EPINEPHRINE (EPIPEN) 0.3 MG/0.3 ML (1:1,000) ATIN] Inject into the shoulder, thigh, or buttocks once.       fluticasone (FLONASE) 50 mcg/actuation nasal spray [FLUTICASONE (FLONASE) 50 MCG/ACTUATION NASAL SPRAY] 1 spray into each nostril as needed.              folic acid (FOLVITE) 1 MG tablet [FOLIC ACID (FOLVITE) 1 MG TABLET] Take 3 tablets (3,000 mcg total) by mouth daily. 270 tablet 0     hydroCHLOROthiazide (HYDRODIURIL) 12.5 MG tablet [HYDROCHLOROTHIAZIDE (HYDRODIURIL) 12.5 MG TABLET] Take 12.5 mg by mouth daily.       levothyroxine (SYNTHROID, LEVOTHROID) 50 MCG tablet [LEVOTHYROXINE  "(SYNTHROID, LEVOTHROID) 50 MCG TABLET] Take 50 mcg by mouth daily.       lidocaine (LIDODERM) 5 % [LIDOCAINE (LIDODERM) 5 %] Apply 1/2 to 1 patch over low back regions prn,  for up to 12 hrs. Then wait 12 hrs prior to applying another patch. 30 patch 2     lidocaine (XYLOCAINE) 5 % ointment [LIDOCAINE (XYLOCAINE) 5 % OINTMENT] Apply topically as needed.       magnesium oxide 250 mg Tab [MAGNESIUM OXIDE 250 MG TAB] Take 250 mg by mouth 2 (two) times a day.       Medical Cannabis [MEDICAL CANNABIS] Use 1 Units As Directed. Take as instructed by the medical cannabis dispensary. The provider who enrolled the patient in the medical cannabis registry and certified this  patient will maintain ownership and liability of all provider reporting and registration requirements.       MEDICATION CANNOT BE REORDERED - PLEASE MANUALLY REORDER AND DISCONTINUE THE OLD ORDER [CRANBERRY FRUIT (CRANBERRY) 475 MG CAP] Take 2 capsules by mouth daily.       melatonin 5 mg Tab tablet [MELATONIN 5 MG TAB TABLET] Take 5 mg by mouth at bedtime.       methotrexate 25 mg/mL injection [METHOTREXATE 25 MG/ML INJECTION] Inject 0.8 mL (20 mg total) under the skin every 7 days. 4 mL 1     methotrexate 50 MG/2ML injection Inject 0.8 mLs (20 mg) Subcutaneous every 7 days 4 mL 1     mirabegron (MYRBETRIQ) 25 mg Tb24 ER tablet [MIRABEGRON (MYRBETRIQ) 25 MG TB24 ER TABLET] Take 25 mg by mouth daily.        MULTIVITAMIN ORAL [MULTIVITAMIN ORAL] Take 1 tablet by mouth 2 times a day at 6:00 am and 4:00 pm. Flinstone chewables       peg 400-propylene glycol (SYSTANE) 0.4-0.3 % Drop [-PROPYLENE GLYCOL (SYSTANE) 0.4-0.3 % DROP] Administer 1 drop to both eyes 4 (four) times a day as needed.       polyethylene glycol (MIRALAX) 17 gram packet [POLYETHYLENE GLYCOL (MIRALAX) 17 GRAM PACKET] Take 17 g by mouth daily as needed.       syringe with needle (BD TUBERCULIN SYRINGE) 1 mL 27 x 1/2\" Syrg [SYRINGE WITH NEEDLE (BD TUBERCULIN SYRINGE) 1 ML 27 X 1/2\" SYRG] " "USE 1 SYRINGE AS DIRECTED ONCE A WEEK. 12 Syringe 0     syringe with needle (BD TUBERCULIN SYRINGE) 1 mL 27 x 1/2\" Syrg [SYRINGE WITH NEEDLE (BD TUBERCULIN SYRINGE) 1 ML 27 X 1/2\" SYRG] USE 1 SYRINGE AS DIRECTED ONCE A WEEK. 12 Syringe 0     syringe, disposable, 1 mL Syrg [SYRINGE, DISPOSABLE, 1 ML SYRG] Please provide 1 mL, 27-gauge syringes for injectable methotrexate use. 50 Syringe 0     Syringe/Needle, Disp, (B-D ECLIPSE SYRINGE) 27G X 1/2\" 1 ML MISC Use for weekly methotrexate injections 12 each 1     traMADol (ULTRAM) 50 mg tablet [TRAMADOL (ULTRAM) 50 MG TABLET] Take 1 tablet p.o.bid-three times a day, prn for moderate to severe pain. 75 tablet 2     trospium (SANCTURA) 20 mg tablet [TROSPIUM (SANCTURA) 20 MG TABLET] Take 20 mg by mouth 2 (two) times a day.       UNABLE TO FIND [UNABLE TO FIND] 2 tablets daily. Med Name:       vit C/E/Zn/coppr/lutein/zeaxan (PRESERVISION AREDS-2 ORAL) [VIT C/E/ZN/COPPR/LUTEIN/ZEAXAN (PRESERVISION AREDS-2 ORAL)] Take by mouth.       vitamin E 400 UNIT capsule [VITAMIN E 400 UNIT CAPSULE] Take 400 Units by mouth 2 (two) times a day.             Physical Exam:  Following up today via phone visit, per Covid-19 pandemic requirements.     Verbal consent has been obtained for this service by a care team member.     Phone call start time: 10:26 AM     Phone callend time: 10:41 AM     Phone number utilized: [phone]    Summary/Assessment:    History that includes seronegative rheumatoid arthritis, osteoarthritis, osteoporosis, fibromyalgia, gastric bypass, chronic low back pains.    Presents for follow-up visit.      States her RA related joint pains have been stable with combination of Orencia every 4 weeks and injectable methotrexate 0.8 mL weekly.      Utilizes Flexeril for low back pains.  States Lidoderm patches not covered by insurance.    Sees orthopedics for knee pains, right greater than left.  Received viscous fluid supplement about 3 months ago.  States told not a " surgical candidate yet for replacement.    Latest monitoring labs noted to be stable.    Please see below for management plan.      From prior note: For chronic low back pains.  Takes Tylenol and Flexeril (from another provider) twice daily with some benefit. Back brace also beneficial.  History low back surgery.     Patient due for repeat labs in about 2 weeks..       Pertinent rheumatology/past medical history (please refer to above for more detailed history):      Seronegative rheumatoid arthritis    High-risk medication use    Osteoarthritis    Chronic knee pains (advanced OA on the right)    Fibromyalgia    History gastric bypass    Osteoporosis    Chronic low back pain/status post surgical fusion L4-L5 (history of spinal stenosis).    Recurrent oral ulcers (folic acid 3 mg daily helpful)    Ramón paresthesias, b/l (seen neurology)       Rheumatology medications provided/suggested:    Methotrexate injectable   Folic acid  Orencia infusion  Synvisc 1  Lidoderm patches  Diclofenac gel      Pertinent medication from other providers or from otc (please refer to above for more detailed med list):    Tylenol arthritis  Cyclobenzaprine  Cymbalta  Prolia  HCTZ   Vitamin D  Magnesium   HCTZ      Pertinent medications already tried:     Eufflexa (not helpful)  Medical marijuana  Tramadol  Gabapentin (dizziness)    Pertinent lab history:      Noted to have negative/unremarkable: Rheumatoid factor, CCP antibody, LEONEL.      Pertinent imaging/test history:        Other:      Has a standing order for labs every 3 months good through December 2019.    On prior visit, suggested looking into obtaining a paraffin wax machine.     Received Covid vaccines.      Plan:        Continue injectable methotrexate 0.8 mL weekly.  Already made aware to hold dose if experiencing infection, can restart thereafter.    Continue folic acid 3 mg daily, except the day when taking methotrexate.  Higher dosing due to oral ulcers.    Continue Orencia  "every 4 weeks.    Made aware can try icy hot patches for low back pains (\"Lidoderm not covered by insurance, already seen by spine clinic\")    Continue Voltaren gel for hand pains.    We will avoid oral NSAIDs given history of gastric bypass.    On Prolia for another provider for osteoporosis.    On Cymbalta from another provider for fibromyalgia.    Established with orthopedics for knee pains (received gel injection lately by orthopedics, prior by us)    Recheck labs in about 2-3 weeks  (can perform just prior to Orencia infusion).    Follow-up in 4 months.        Total time spent 20 minutes involved with patient care, includes placing orders, reviewing records and and formulating management plan.      This note was transcribed using Dragon voice recognition software as a result unintentional grammatical errors or word substitutions may have occurred. Please contact our Rheumatology department if you need any clarification or if you have any related inquiries.        Quinten Cerna DO ....................  10/5/2021   9:03 AM      "

## 2021-10-16 ENCOUNTER — HEALTH MAINTENANCE LETTER (OUTPATIENT)
Age: 71
End: 2021-10-16

## 2022-07-23 ENCOUNTER — HEALTH MAINTENANCE LETTER (OUTPATIENT)
Age: 72
End: 2022-07-23

## 2022-10-01 ENCOUNTER — HEALTH MAINTENANCE LETTER (OUTPATIENT)
Age: 72
End: 2022-10-01

## 2023-08-06 ENCOUNTER — HEALTH MAINTENANCE LETTER (OUTPATIENT)
Age: 73
End: 2023-08-06

## 2023-12-24 ENCOUNTER — HEALTH MAINTENANCE LETTER (OUTPATIENT)
Age: 73
End: 2023-12-24

## 2024-09-28 ENCOUNTER — HEALTH MAINTENANCE LETTER (OUTPATIENT)
Age: 74
End: 2024-09-28